# Patient Record
Sex: MALE | Race: WHITE | NOT HISPANIC OR LATINO | Employment: OTHER | URBAN - METROPOLITAN AREA
[De-identification: names, ages, dates, MRNs, and addresses within clinical notes are randomized per-mention and may not be internally consistent; named-entity substitution may affect disease eponyms.]

---

## 2017-04-11 ENCOUNTER — RECORDS - HEALTHEAST (OUTPATIENT)
Dept: LAB | Facility: CLINIC | Age: 67
End: 2017-04-11

## 2017-04-11 LAB
CHOLEST SERPL-MCNC: 121 MG/DL
FASTING STATUS PATIENT QL REPORTED: YES
HDLC SERPL-MCNC: 46 MG/DL
LDLC SERPL CALC-MCNC: 58 MG/DL
PSA SERPL-MCNC: 3.3 NG/ML (ref 0–4.5)
TRIGL SERPL-MCNC: 84 MG/DL

## 2017-09-26 ENCOUNTER — AMBULATORY - HEALTHEAST (OUTPATIENT)
Dept: CARDIOLOGY | Facility: CLINIC | Age: 67
End: 2017-09-26

## 2017-09-26 ENCOUNTER — RECORDS - HEALTHEAST (OUTPATIENT)
Dept: ADMINISTRATIVE | Facility: OTHER | Age: 67
End: 2017-09-26

## 2017-09-27 ENCOUNTER — OFFICE VISIT - HEALTHEAST (OUTPATIENT)
Dept: CARDIOLOGY | Facility: CLINIC | Age: 67
End: 2017-09-27

## 2017-09-27 DIAGNOSIS — I35.9 AORTIC VALVE DISORDER: ICD-10-CM

## 2017-09-27 DIAGNOSIS — I25.10 ATHEROSCLEROSIS OF NATIVE CORONARY ARTERY OF NATIVE HEART WITHOUT ANGINA PECTORIS: ICD-10-CM

## 2017-09-27 DIAGNOSIS — R06.09 DYSPNEA ON EXERTION: ICD-10-CM

## 2017-09-27 ASSESSMENT — MIFFLIN-ST. JEOR: SCORE: 1697.93

## 2017-10-03 ENCOUNTER — HOSPITAL ENCOUNTER (OUTPATIENT)
Dept: CARDIOLOGY | Facility: CLINIC | Age: 67
Discharge: HOME | End: 2017-10-03
Attending: INTERNAL MEDICINE

## 2017-10-03 DIAGNOSIS — I25.10 ATHEROSCLEROSIS OF NATIVE CORONARY ARTERY OF NATIVE HEART WITHOUT ANGINA PECTORIS: ICD-10-CM

## 2017-10-03 DIAGNOSIS — R06.09 OTHER FORMS OF DYSPNEA: ICD-10-CM

## 2017-10-03 DIAGNOSIS — R06.09 DYSPNEA ON EXERTION: ICD-10-CM

## 2017-10-03 DIAGNOSIS — I35.9 AORTIC VALVE DISORDER: ICD-10-CM

## 2017-10-03 LAB
CV STRESS CURRENT BP HE: NORMAL
CV STRESS CURRENT HR HE: 100
CV STRESS CURRENT HR HE: 101
CV STRESS CURRENT HR HE: 106
CV STRESS CURRENT HR HE: 108
CV STRESS CURRENT HR HE: 108
CV STRESS CURRENT HR HE: 114
CV STRESS CURRENT HR HE: 115
CV STRESS CURRENT HR HE: 117
CV STRESS CURRENT HR HE: 122
CV STRESS CURRENT HR HE: 124
CV STRESS CURRENT HR HE: 129
CV STRESS CURRENT HR HE: 132
CV STRESS CURRENT HR HE: 132
CV STRESS CURRENT HR HE: 135
CV STRESS CURRENT HR HE: 136
CV STRESS CURRENT HR HE: 136
CV STRESS CURRENT HR HE: 68
CV STRESS CURRENT HR HE: 68
CV STRESS CURRENT HR HE: 75
CV STRESS CURRENT HR HE: 84
CV STRESS CURRENT HR HE: 85
CV STRESS CURRENT HR HE: 88
CV STRESS CURRENT HR HE: 88
CV STRESS CURRENT HR HE: 90
CV STRESS CURRENT HR HE: 91
CV STRESS CURRENT HR HE: 92
CV STRESS CURRENT HR HE: 95
CV STRESS CURRENT HR HE: 98
CV STRESS CURRENT HR HE: 99
CV STRESS DEVIATION TIME HE: NORMAL
CV STRESS ECHO PERCENT HR HE: NORMAL
CV STRESS EXERCISE STAGE HE: NORMAL
CV STRESS FINAL RESTING BP HE: NORMAL
CV STRESS FINAL RESTING HR HE: 84
CV STRESS MAX HR HE: 138
CV STRESS MAX TREADMILL GRADE HE: 16
CV STRESS MAX TREADMILL SPEED HE: 4.2
CV STRESS PEAK DIA BP HE: NORMAL
CV STRESS PEAK SYS BP HE: NORMAL
CV STRESS PHASE HE: NORMAL
CV STRESS PROTOCOL HE: NORMAL
CV STRESS RESTING PT POSITION HE: NORMAL
CV STRESS RESTING PT POSITION HE: NORMAL
CV STRESS ST DEVIATION AMOUNT HE: NORMAL
CV STRESS ST DEVIATION ELEVATION HE: NORMAL
CV STRESS ST EVELATION AMOUNT HE: NORMAL
CV STRESS TEST TYPE HE: NORMAL
CV STRESS TOTAL STAGE TIME MIN 1 HE: NORMAL
STRESS ECHO BASELINE BP: NORMAL
STRESS ECHO BASELINE HR: 68
STRESS ECHO CALCULATED PERCENT HR: 90 %
STRESS ECHO LAST STRESS BP: NORMAL
STRESS ECHO LAST STRESS HR: 136
STRESS ECHO POST ESTIMATED WORKLOAD: 12.1
STRESS ECHO POST EXERCISE DUR MIN: 10
STRESS ECHO POST EXERCISE DUR SEC: 30
STRESS ECHO TARGET HR: 130

## 2018-06-26 ENCOUNTER — RECORDS - HEALTHEAST (OUTPATIENT)
Dept: LAB | Facility: CLINIC | Age: 68
End: 2018-06-26

## 2018-06-26 LAB
ALBUMIN SERPL-MCNC: 4 G/DL (ref 3.5–5)
ALP SERPL-CCNC: 99 U/L (ref 45–120)
ALT SERPL W P-5'-P-CCNC: 28 U/L (ref 0–45)
ANION GAP SERPL CALCULATED.3IONS-SCNC: 10 MMOL/L (ref 5–18)
AST SERPL W P-5'-P-CCNC: 25 U/L (ref 0–40)
BILIRUB SERPL-MCNC: 2.3 MG/DL (ref 0–1)
BUN SERPL-MCNC: 19 MG/DL (ref 8–22)
CALCIUM SERPL-MCNC: 9.6 MG/DL (ref 8.5–10.5)
CHLORIDE BLD-SCNC: 106 MMOL/L (ref 98–107)
CHOLEST SERPL-MCNC: 126 MG/DL
CO2 SERPL-SCNC: 23 MMOL/L (ref 22–31)
CREAT SERPL-MCNC: 1.11 MG/DL (ref 0.7–1.3)
FASTING STATUS PATIENT QL REPORTED: YES
GFR SERPL CREATININE-BSD FRML MDRD: >60 ML/MIN/1.73M2
GLUCOSE BLD-MCNC: 103 MG/DL (ref 70–125)
HDLC SERPL-MCNC: 45 MG/DL
LDLC SERPL CALC-MCNC: 66 MG/DL
POTASSIUM BLD-SCNC: 4.6 MMOL/L (ref 3.5–5)
PROT SERPL-MCNC: 6.8 G/DL (ref 6–8)
PSA SERPL-MCNC: 1.3 NG/ML (ref 0–4.5)
SODIUM SERPL-SCNC: 139 MMOL/L (ref 136–145)
TRIGL SERPL-MCNC: 77 MG/DL

## 2018-10-01 ENCOUNTER — RECORDS - HEALTHEAST (OUTPATIENT)
Dept: LAB | Facility: CLINIC | Age: 68
End: 2018-10-01

## 2018-10-01 LAB
ANION GAP SERPL CALCULATED.3IONS-SCNC: 8 MMOL/L (ref 5–18)
BUN SERPL-MCNC: 14 MG/DL (ref 8–22)
CALCIUM SERPL-MCNC: 10.4 MG/DL (ref 8.5–10.5)
CHLORIDE BLD-SCNC: 104 MMOL/L (ref 98–107)
CO2 SERPL-SCNC: 29 MMOL/L (ref 22–31)
CREAT SERPL-MCNC: 1 MG/DL (ref 0.7–1.3)
GFR SERPL CREATININE-BSD FRML MDRD: >60 ML/MIN/1.73M2
GLUCOSE BLD-MCNC: 110 MG/DL (ref 70–125)
POTASSIUM BLD-SCNC: 4.3 MMOL/L (ref 3.5–5)
SODIUM SERPL-SCNC: 141 MMOL/L (ref 136–145)

## 2018-10-02 RX ORDER — ATORVASTATIN CALCIUM 80 MG/1
80 TABLET, FILM COATED ORAL DAILY
COMMUNITY

## 2018-10-03 ENCOUNTER — ANESTHESIA EVENT (OUTPATIENT)
Dept: SURGERY | Facility: CLINIC | Age: 68
End: 2018-10-03
Payer: COMMERCIAL

## 2018-10-05 ENCOUNTER — HOSPITAL ENCOUNTER (OUTPATIENT)
Facility: CLINIC | Age: 68
Discharge: HOME | End: 2018-10-05
Attending: ORTHOPAEDIC SURGERY | Admitting: ORTHOPAEDIC SURGERY
Payer: COMMERCIAL

## 2018-10-05 ENCOUNTER — ANESTHESIA (OUTPATIENT)
Dept: SURGERY | Facility: CLINIC | Age: 68
End: 2018-10-05
Payer: COMMERCIAL

## 2018-10-05 VITALS
HEIGHT: 68 IN | BODY MASS INDEX: 32.58 KG/M2 | WEIGHT: 215 LBS | HEART RATE: 64 BPM | DIASTOLIC BLOOD PRESSURE: 73 MMHG | RESPIRATION RATE: 16 BRPM | TEMPERATURE: 98.1 F | OXYGEN SATURATION: 93 % | SYSTOLIC BLOOD PRESSURE: 151 MMHG

## 2018-10-05 DIAGNOSIS — G56.22 CUBITAL TUNNEL SYNDROME ON LEFT: Primary | ICD-10-CM

## 2018-10-05 PROCEDURE — 25000125 ZZHC RX 250: Performed by: NURSE ANESTHETIST, CERTIFIED REGISTERED

## 2018-10-05 PROCEDURE — 25000125 ZZHC RX 250: Performed by: ORTHOPAEDIC SURGERY

## 2018-10-05 PROCEDURE — 71000027 ZZH RECOVERY PHASE 2 EACH 15 MINS: Performed by: ORTHOPAEDIC SURGERY

## 2018-10-05 PROCEDURE — 25000128 H RX IP 250 OP 636: Performed by: NURSE ANESTHETIST, CERTIFIED REGISTERED

## 2018-10-05 PROCEDURE — 25000132 ZZH RX MED GY IP 250 OP 250 PS 637: Performed by: ORTHOPAEDIC SURGERY

## 2018-10-05 PROCEDURE — 40000305 ZZH STATISTIC PRE PROC ASSESS I: Performed by: ORTHOPAEDIC SURGERY

## 2018-10-05 PROCEDURE — 71000014 ZZH RECOVERY PHASE 1 LEVEL 2 FIRST HR: Performed by: ORTHOPAEDIC SURGERY

## 2018-10-05 PROCEDURE — 25000128 H RX IP 250 OP 636: Performed by: PHYSICIAN ASSISTANT

## 2018-10-05 PROCEDURE — 37000008 ZZH ANESTHESIA TECHNICAL FEE, 1ST 30 MIN: Performed by: ORTHOPAEDIC SURGERY

## 2018-10-05 PROCEDURE — 36000056 ZZH SURGERY LEVEL 3 1ST 30 MIN: Performed by: ORTHOPAEDIC SURGERY

## 2018-10-05 PROCEDURE — 37000009 ZZH ANESTHESIA TECHNICAL FEE, EACH ADDTL 15 MIN: Performed by: ORTHOPAEDIC SURGERY

## 2018-10-05 PROCEDURE — 27210794 ZZH OR GENERAL SUPPLY STERILE: Performed by: ORTHOPAEDIC SURGERY

## 2018-10-05 PROCEDURE — 88304 TISSUE EXAM BY PATHOLOGIST: CPT | Mod: 26 | Performed by: ORTHOPAEDIC SURGERY

## 2018-10-05 PROCEDURE — 36000058 ZZH SURGERY LEVEL 3 EA 15 ADDTL MIN: Performed by: ORTHOPAEDIC SURGERY

## 2018-10-05 PROCEDURE — 88304 TISSUE EXAM BY PATHOLOGIST: CPT | Performed by: ORTHOPAEDIC SURGERY

## 2018-10-05 PROCEDURE — 25000132 ZZH RX MED GY IP 250 OP 250 PS 637: Performed by: NURSE ANESTHETIST, CERTIFIED REGISTERED

## 2018-10-05 PROCEDURE — 25000564 ZZH DESFLURANE, EA 15 MIN: Performed by: ORTHOPAEDIC SURGERY

## 2018-10-05 PROCEDURE — 25000132 ZZH RX MED GY IP 250 OP 250 PS 637: Performed by: PHYSICIAN ASSISTANT

## 2018-10-05 RX ORDER — CEFAZOLIN SODIUM 1 G/50ML
1 INJECTION, SOLUTION INTRAVENOUS SEE ADMIN INSTRUCTIONS
Status: DISCONTINUED | OUTPATIENT
Start: 2018-10-05 | End: 2018-10-05 | Stop reason: HOSPADM

## 2018-10-05 RX ORDER — ACETAMINOPHEN 325 MG/1
975 TABLET ORAL ONCE
Status: DISCONTINUED | OUTPATIENT
Start: 2018-10-05 | End: 2018-10-05

## 2018-10-05 RX ORDER — OXYCODONE HYDROCHLORIDE 5 MG/1
5 TABLET ORAL
Status: DISCONTINUED | OUTPATIENT
Start: 2018-10-05 | End: 2018-10-05 | Stop reason: HOSPADM

## 2018-10-05 RX ORDER — ONDANSETRON 4 MG/1
4 TABLET, ORALLY DISINTEGRATING ORAL EVERY 30 MIN PRN
Status: DISCONTINUED | OUTPATIENT
Start: 2018-10-05 | End: 2018-10-05

## 2018-10-05 RX ORDER — DEXAMETHASONE SODIUM PHOSPHATE 4 MG/ML
INJECTION, SOLUTION INTRA-ARTICULAR; INTRALESIONAL; INTRAMUSCULAR; INTRAVENOUS; SOFT TISSUE PRN
Status: DISCONTINUED | OUTPATIENT
Start: 2018-10-05 | End: 2018-10-05

## 2018-10-05 RX ORDER — NALOXONE HYDROCHLORIDE 0.4 MG/ML
.1-.4 INJECTION, SOLUTION INTRAMUSCULAR; INTRAVENOUS; SUBCUTANEOUS
Status: DISCONTINUED | OUTPATIENT
Start: 2018-10-05 | End: 2018-10-05 | Stop reason: HOSPADM

## 2018-10-05 RX ORDER — BUPIVACAINE HYDROCHLORIDE AND EPINEPHRINE 5; 5 MG/ML; UG/ML
INJECTION, SOLUTION PERINEURAL PRN
Status: DISCONTINUED | OUTPATIENT
Start: 2018-10-05 | End: 2018-10-05 | Stop reason: HOSPADM

## 2018-10-05 RX ORDER — HYDROCODONE BITARTRATE AND ACETAMINOPHEN 5; 325 MG/1; MG/1
1 TABLET ORAL EVERY 6 HOURS PRN
Status: DISCONTINUED | OUTPATIENT
Start: 2018-10-05 | End: 2018-10-05 | Stop reason: HOSPADM

## 2018-10-05 RX ORDER — FENTANYL CITRATE 50 UG/ML
25-50 INJECTION, SOLUTION INTRAMUSCULAR; INTRAVENOUS
Status: DISCONTINUED | OUTPATIENT
Start: 2018-10-05 | End: 2018-10-05 | Stop reason: HOSPADM

## 2018-10-05 RX ORDER — LIDOCAINE 40 MG/G
CREAM TOPICAL
Status: DISCONTINUED | OUTPATIENT
Start: 2018-10-05 | End: 2018-10-05 | Stop reason: HOSPADM

## 2018-10-05 RX ORDER — HYDROCODONE BITARTRATE AND ACETAMINOPHEN 5; 325 MG/1; MG/1
1-2 TABLET ORAL EVERY 4 HOURS PRN
Qty: 10 TABLET | Refills: 0
Start: 2018-10-05 | End: 2023-04-07

## 2018-10-05 RX ORDER — LIDOCAINE HYDROCHLORIDE 10 MG/ML
INJECTION, SOLUTION EPIDURAL; INFILTRATION; INTRACAUDAL; PERINEURAL PRN
Status: DISCONTINUED | OUTPATIENT
Start: 2018-10-05 | End: 2018-10-05

## 2018-10-05 RX ORDER — MEPERIDINE HYDROCHLORIDE 25 MG/ML
12.5 INJECTION INTRAMUSCULAR; INTRAVENOUS; SUBCUTANEOUS
Status: DISCONTINUED | OUTPATIENT
Start: 2018-10-05 | End: 2018-10-05 | Stop reason: HOSPADM

## 2018-10-05 RX ORDER — SCOLOPAMINE TRANSDERMAL SYSTEM 1 MG/1
1 PATCH, EXTENDED RELEASE TRANSDERMAL
Status: DISCONTINUED | OUTPATIENT
Start: 2018-10-05 | End: 2018-10-05 | Stop reason: HOSPADM

## 2018-10-05 RX ORDER — CELECOXIB 200 MG/1
200 CAPSULE ORAL
Status: DISCONTINUED | OUTPATIENT
Start: 2018-10-05 | End: 2018-10-05 | Stop reason: HOSPADM

## 2018-10-05 RX ORDER — SODIUM CHLORIDE, SODIUM LACTATE, POTASSIUM CHLORIDE, CALCIUM CHLORIDE 600; 310; 30; 20 MG/100ML; MG/100ML; MG/100ML; MG/100ML
INJECTION, SOLUTION INTRAVENOUS CONTINUOUS
Status: DISCONTINUED | OUTPATIENT
Start: 2018-10-05 | End: 2018-10-05 | Stop reason: HOSPADM

## 2018-10-05 RX ORDER — MEPERIDINE HYDROCHLORIDE 25 MG/ML
12.5 INJECTION INTRAMUSCULAR; INTRAVENOUS; SUBCUTANEOUS
Status: DISCONTINUED | OUTPATIENT
Start: 2018-10-05 | End: 2018-10-05

## 2018-10-05 RX ORDER — CELECOXIB 200 MG/1
200 CAPSULE ORAL ONCE
Status: COMPLETED | OUTPATIENT
Start: 2018-10-05 | End: 2018-10-05

## 2018-10-05 RX ORDER — ONDANSETRON 2 MG/ML
INJECTION INTRAMUSCULAR; INTRAVENOUS PRN
Status: DISCONTINUED | OUTPATIENT
Start: 2018-10-05 | End: 2018-10-05

## 2018-10-05 RX ORDER — ALBUTEROL SULFATE 0.83 MG/ML
2.5 SOLUTION RESPIRATORY (INHALATION) EVERY 4 HOURS PRN
Status: DISCONTINUED | OUTPATIENT
Start: 2018-10-05 | End: 2018-10-05 | Stop reason: HOSPADM

## 2018-10-05 RX ORDER — HYDROMORPHONE HYDROCHLORIDE 1 MG/ML
.3-.5 INJECTION, SOLUTION INTRAMUSCULAR; INTRAVENOUS; SUBCUTANEOUS EVERY 10 MIN PRN
Status: DISCONTINUED | OUTPATIENT
Start: 2018-10-05 | End: 2018-10-05 | Stop reason: HOSPADM

## 2018-10-05 RX ORDER — CEFAZOLIN SODIUM 2 G/100ML
2 INJECTION, SOLUTION INTRAVENOUS
Status: COMPLETED | OUTPATIENT
Start: 2018-10-05 | End: 2018-10-05

## 2018-10-05 RX ORDER — HYDROMORPHONE HYDROCHLORIDE 1 MG/ML
.3-.5 INJECTION, SOLUTION INTRAMUSCULAR; INTRAVENOUS; SUBCUTANEOUS EVERY 10 MIN PRN
Status: DISCONTINUED | OUTPATIENT
Start: 2018-10-05 | End: 2018-10-05

## 2018-10-05 RX ORDER — ALBUTEROL SULFATE 0.83 MG/ML
2.5 SOLUTION RESPIRATORY (INHALATION) EVERY 4 HOURS PRN
Status: DISCONTINUED | OUTPATIENT
Start: 2018-10-05 | End: 2018-10-05

## 2018-10-05 RX ORDER — ACETAMINOPHEN 325 MG/1
975 TABLET ORAL ONCE
Status: COMPLETED | OUTPATIENT
Start: 2018-10-05 | End: 2018-10-05

## 2018-10-05 RX ORDER — GABAPENTIN 300 MG/1
300 CAPSULE ORAL
Status: COMPLETED | OUTPATIENT
Start: 2018-10-05 | End: 2018-10-05

## 2018-10-05 RX ORDER — NALOXONE HYDROCHLORIDE 0.4 MG/ML
.1-.4 INJECTION, SOLUTION INTRAMUSCULAR; INTRAVENOUS; SUBCUTANEOUS
Status: DISCONTINUED | OUTPATIENT
Start: 2018-10-05 | End: 2018-10-05

## 2018-10-05 RX ORDER — GLYCOPYRROLATE 0.2 MG/ML
INJECTION, SOLUTION INTRAMUSCULAR; INTRAVENOUS PRN
Status: DISCONTINUED | OUTPATIENT
Start: 2018-10-05 | End: 2018-10-05

## 2018-10-05 RX ORDER — ONDANSETRON 4 MG/1
4 TABLET, ORALLY DISINTEGRATING ORAL EVERY 30 MIN PRN
Status: DISCONTINUED | OUTPATIENT
Start: 2018-10-05 | End: 2018-10-05 | Stop reason: HOSPADM

## 2018-10-05 RX ORDER — FENTANYL CITRATE 50 UG/ML
INJECTION, SOLUTION INTRAMUSCULAR; INTRAVENOUS PRN
Status: DISCONTINUED | OUTPATIENT
Start: 2018-10-05 | End: 2018-10-05

## 2018-10-05 RX ORDER — ONDANSETRON 2 MG/ML
4 INJECTION INTRAMUSCULAR; INTRAVENOUS EVERY 30 MIN PRN
Status: DISCONTINUED | OUTPATIENT
Start: 2018-10-05 | End: 2018-10-05 | Stop reason: HOSPADM

## 2018-10-05 RX ORDER — FENTANYL CITRATE 50 UG/ML
25-50 INJECTION, SOLUTION INTRAMUSCULAR; INTRAVENOUS
Status: DISCONTINUED | OUTPATIENT
Start: 2018-10-05 | End: 2018-10-05

## 2018-10-05 RX ORDER — ONDANSETRON 2 MG/ML
4 INJECTION INTRAMUSCULAR; INTRAVENOUS EVERY 30 MIN PRN
Status: DISCONTINUED | OUTPATIENT
Start: 2018-10-05 | End: 2018-10-05

## 2018-10-05 RX ORDER — GABAPENTIN 300 MG/1
300 CAPSULE ORAL ONCE
Status: DISCONTINUED | OUTPATIENT
Start: 2018-10-05 | End: 2018-10-05

## 2018-10-05 RX ORDER — EPHEDRINE SULFATE 50 MG/ML
INJECTION, SOLUTION INTRAMUSCULAR; INTRAVENOUS; SUBCUTANEOUS PRN
Status: DISCONTINUED | OUTPATIENT
Start: 2018-10-05 | End: 2018-10-05

## 2018-10-05 RX ORDER — PROPOFOL 10 MG/ML
INJECTION, EMULSION INTRAVENOUS PRN
Status: DISCONTINUED | OUTPATIENT
Start: 2018-10-05 | End: 2018-10-05

## 2018-10-05 RX ADMIN — CEFAZOLIN SODIUM 2 G: 2 INJECTION, SOLUTION INTRAVENOUS at 13:41

## 2018-10-05 RX ADMIN — GABAPENTIN 300 MG: 300 CAPSULE ORAL at 11:42

## 2018-10-05 RX ADMIN — PHENYLEPHRINE HYDROCHLORIDE 200 MCG: 10 INJECTION, SOLUTION INTRAMUSCULAR; INTRAVENOUS; SUBCUTANEOUS at 13:58

## 2018-10-05 RX ADMIN — SCOPALAMINE 1 PATCH: 1 PATCH, EXTENDED RELEASE TRANSDERMAL at 13:33

## 2018-10-05 RX ADMIN — FENTANYL CITRATE 50 MCG: 50 INJECTION, SOLUTION INTRAMUSCULAR; INTRAVENOUS at 13:46

## 2018-10-05 RX ADMIN — LIDOCAINE HYDROCHLORIDE 1 ML: 10 INJECTION, SOLUTION EPIDURAL; INFILTRATION; INTRACAUDAL; PERINEURAL at 11:44

## 2018-10-05 RX ADMIN — GLYCOPYRROLATE 0.2 MG: 0.2 INJECTION, SOLUTION INTRAMUSCULAR; INTRAVENOUS at 13:49

## 2018-10-05 RX ADMIN — SODIUM CHLORIDE, POTASSIUM CHLORIDE, SODIUM LACTATE AND CALCIUM CHLORIDE: 600; 310; 30; 20 INJECTION, SOLUTION INTRAVENOUS at 11:44

## 2018-10-05 RX ADMIN — HYDROCODONE BITARTRATE AND ACETAMINOPHEN 1 TABLET: 5; 325 TABLET ORAL at 15:13

## 2018-10-05 RX ADMIN — DEXAMETHASONE SODIUM PHOSPHATE 8 MG: 4 INJECTION, SOLUTION INTRA-ARTICULAR; INTRALESIONAL; INTRAMUSCULAR; INTRAVENOUS; SOFT TISSUE at 13:49

## 2018-10-05 RX ADMIN — PROPOFOL 200 MG: 10 INJECTION, EMULSION INTRAVENOUS at 13:49

## 2018-10-05 RX ADMIN — CELECOXIB 200 MG: 200 CAPSULE ORAL at 11:42

## 2018-10-05 RX ADMIN — LIDOCAINE HYDROCHLORIDE 50 MG: 10 INJECTION, SOLUTION EPIDURAL; INFILTRATION; INTRACAUDAL; PERINEURAL at 13:49

## 2018-10-05 RX ADMIN — FENTANYL CITRATE 100 MCG: 50 INJECTION, SOLUTION INTRAMUSCULAR; INTRAVENOUS at 13:49

## 2018-10-05 RX ADMIN — ACETAMINOPHEN 975 MG: 325 TABLET, FILM COATED ORAL at 11:42

## 2018-10-05 RX ADMIN — ONDANSETRON 4 MG: 2 INJECTION INTRAMUSCULAR; INTRAVENOUS at 13:49

## 2018-10-05 RX ADMIN — MIDAZOLAM 2 MG: 1 INJECTION INTRAMUSCULAR; INTRAVENOUS at 13:41

## 2018-10-05 RX ADMIN — Medication 10 MG: at 13:53

## 2018-10-05 RX ADMIN — Medication 10 MG: at 13:56

## 2018-10-05 NOTE — ANESTHESIA PREPROCEDURE EVALUATION
Anesthesia Evaluation     . Pt has had prior anesthetic. Type: General and MAC    History of anesthetic complications   - PONV        ROS/MED HX    ENT/Pulmonary:  - neg pulmonary ROS     Neurologic:  - neg neurologic ROS     Cardiovascular:     (+) Dyslipidemia, hypertension--CAD (Mild), --. : . . . :. valvular problems/murmurs (Mild/mod unchanged) type: AS .       METS/Exercise Tolerance:  >4 METS   Hematologic:  - neg hematologic  ROS       Musculoskeletal:   (+) , , other musculoskeletal- Spinal stenosis      GI/Hepatic:  - neg GI/hepatic ROS       Renal/Genitourinary:  - ROS Renal section negative       Endo:  - neg endo ROS       Psychiatric:  - neg psychiatric ROS       Infectious Disease:  - neg infectious disease ROS       Malignancy:      - no malignancy   Other:    - neg other ROS                 Physical Exam  Normal systems: cardiovascular and pulmonary    Airway   Mallampati: III  TM distance: >3 FB  Neck ROM: full    Dental   (+) upper dentures and partials    Cardiovascular       Pulmonary                     Anesthesia Plan      History & Physical Review      ASA Status:  2 .    NPO Status:  > 8 hours    Plan for General and LMA with Intravenous and Propofol induction. Maintenance will be Balanced.    PONV prophylaxis:  Ondansetron (or other 5HT-3), Dexamethasone or Solumedrol and Scopolamine patch  The History and Physical has been reviewed, the patient has been examined and no changes have occurred in the patient's condition since the H & P was completed.         Postoperative Care  Postoperative pain management:  IV analgesics and Oral pain medications.      Consents  Anesthetic plan, risks, benefits and alternatives discussed with:  Patient..                          .

## 2018-10-05 NOTE — IP AVS SNAPSHOT
MRN:[id number]                      After Visit Summary   10/5/2018    Chandler Eldridge    MRN: [id number]           Thank you!     Thank you for choosing [facility] for your care. Our goal is always to provide you with excellent care. Hearing back from our patients is one way we can continue to improve our services. Please take a few minutes to complete the written survey that you may receive in the mail after you visit with us. Thank you!        Patient Information     Date Of Birth          1950        About your hospital stay     You were admitted on:  October 5, 2018 You last received care in the:  [facility] PreOP/Phase II    You were discharged on:  October 5, 2018       Who to Call     For medical emergencies, please call 911.  For non-urgent questions about your medical care, please call your primary care provider or clinic, [phone]  For questions related to your surgery, please call your surgery clinic        Attending Provider     Provider Specialty    Gene Garcia MD Hand Surgery       Primary Care Provider Office Phone # Fax #    Naman Padron [phone]      After Care Instructions      Diet as Tolerated       Return to diet before surgery, unless instructed otherwise.            Discharge Instructions       Review outpatient procedure discharge instructions with patient as directed by Provider            Ice to affected area       Ice pack to surgical site every 15 minutes per hour for 24 hours            No Dressing Change       No dressing change until follow up appointment.            Return to clinic       Return to clinic in 2 weeks            Shower        Cover dressing if dressing is not going to be changed today                  Further instructions from your care team                           Same Day Surgery Discharge Instructions  Special Precautions After Surgery - Adult    1. It is not unusual to feel lightheaded or faint, up to 24 hours  after surgery or while taking pain medication.  If you have these symptoms; sit for a few minutes before standing and have someone assist you when getting up.  2. You should rest and relax for the next 24 hours and must have someone stay with you for at least 24 hours after your discharge.  3. DO NOT DRIVE any vehicle or operate mechanical equipment for 24 hours following the end of your surgery.  DO NOT DRIVE while taking narcotic pain medications that have been prescribed by your physician.  If you had a limb operated on, you must be able to use it fully to drive.  4. DO NOT drink alcoholic beverages for 24 hours following surgery or while taking prescription pain medication.  5. Drink clear liquids (apple juice, ginger ale, broth, 7-Up, etc.).  Progress to your regular diet as you feel able.  6. Any questions call your physician and do not make important decisions for 24 hours.    ACTIVITY  ? Rest today.  No activity or diet restrictions.  ? Resume activity as tolerated.  ? Restrictions: per MD directions  ? See printed discharge sheet.     INCISIONAL CARE  ? Do not remove dressing until seen by physician.  ? Keep extremity elevated above the level of the heart if possible.  .  ? Apply ice 1/2 hour on and 1/2 hour off while awake.  ? Be alert for signs of infection:  redness, swelling, heat, drainage of pus, and/or elevated temperature.  Contact your doctor if these occur.        Call for an appointment to return to the clinic in 10-14 days.    Medications:  ? Hydrocodone (Norco, Vicodin):  Last dose: 3 pm.  ? Stool softeners to prevent constipation   ? Follow the instructions on the bottle.     Additional discharge instructions: sling per MD orders  __________________________________________________________________________________________________________________________________  IMPORTANT NUMBERS:    [facility] [place]  [phone], [phone]  Pharmacy:  [phone]  Same Day Surgery:  [phone], Monday  "- Friday until 8:30 p.m.  Urgent Care:  [phone]  Emergency Room:  [phone]      [facility]:  [phone]           Pending Results     Date and Time Order Name Status Description    10/5/2018 1409 Surgical pathology exam In process             Admission Information     Date & Time Provider Department Dept. Phone    10/5/2018 Gene Garcia MD [facility] PreOP/Phase [phone]      Your Vitals Were     Blood Pressure Pulse Temperature Respirations Height Weight    154/82 64 98.1  F (36.7  C) (Oral) 15 1.727 m (5' 8\") 97.5 kg (215 lb)    Pulse Oximetry BMI (Body Mass Index)                93% 32.69 kg/m2          MyChart Information     [facility] lets you send messages to your doctor, view your test results, renew your prescriptions, schedule appointments and more. To sign up, go to [email or web address] . Click on \"Log in\" on the left side of the screen, which will take you to the Welcome page. Then click on \"Sign up Now\" on the right side of the page.     You will be asked to enter the access code listed below, as well as some personal information. Please follow the directions to create your username and password.     Your access code is: [email or web address]  Expires: 1/3/2019  3:57 PM     Your access code will  in 90 days. If you need help or a new code, please call your [facility] clinic or [phone].        Care EveryWhere ID     This is your Care EveryWhere ID. This could be used by other organizations to access your [facility] medical records  [id number]        Equal Access to Services     [facility] : Hadii daphney Abernathy, melvina jalloh, qamax macias . So [facility] [phone].    ATENCIÓN: Si habla español, tiene a corrales disposición servicios gratuitos de asistencia lingüística. Llame al [phone].    We comply with applicable federal civil rights laws and [place] laws. We do not discriminate on the basis of " race, color, national origin, age, disability, sex, sexual orientation, or gender identity.               Review of your medicines      START taking        Dose / Directions    HYDROcodone-acetaminophen 5-325 MG per tablet   Commonly known as:  NORCO   Used for:  Cubital tunnel syndrome on left        Dose:  1-2 tablet   Take 1-2 tablets by mouth every 4 hours as needed (Moderate to Severe Pain)   Quantity:  10 tablet   Refills:  0         CONTINUE these medicines which have NOT CHANGED        Dose / Directions    atorvastatin 80 MG tablet   Commonly known as:  LIPITOR        Dose:  80 mg   Take 80 mg by mouth daily   Refills:  0       LOSARTAN POTASSIUM PO        Dose:  80 mg   Take 80 mg by mouth   Refills:  0            Where to get your medicines      Some of these will need a paper prescription and others can be bought over the counter. Ask your nurse if you have questions.     You don't need a prescription for these medications     HYDROcodone-acetaminophen 5-325 MG per tablet                Protect others around you: Learn how to safely use, store and throw away your medicines at www.disposemymeds.org.        Information about OPIOIDS     PRESCRIPTION OPIOIDS: WHAT YOU NEED TO KNOW   We gave you an opioid (narcotic) pain medicine. It is important to manage your pain, but opioids are not always the best choice. You should first try all the other options your care team gave you. Take this medicine for as short a time (and as few doses) as possible.    Some activities can increase your pain, such as bandage changes or therapy sessions. It may help to take your pain medicine 30 to 60 minutes before these activities. Reduce your stress by getting enough sleep, working on hobbies you enjoy and practicing relaxation or meditation. Talk to your care team about ways to manage your pain beyond prescription opioids.    These medicines have risks:    DO NOT drive when on new or higher doses of pain medicine. These  medicines can affect your alertness and reaction times, and you could be arrested for driving under the influence (DUI). If you need to use opioids long-term, talk to your care team about driving.    DO NOT operate heavy machinery    DO NOT do any other dangerous activities while taking these medicines.    DO NOT drink any alcohol while taking these medicines.     If the opioid prescribed includes acetaminophen, DO NOT take with any other medicines that contain acetaminophen. Read all labels carefully. Look for the word  acetaminophen  or  Tylenol.  Ask your pharmacist if you have questions or are unsure.    You can get addicted to pain medicines, especially if you have a history of addiction (chemical, alcohol or substance dependence). Talk to your care team about ways to reduce this risk.    All opioids tend to cause constipation. Drink plenty of water and eat foods that have a lot of fiber, such as fruits, vegetables, prune juice, apple juice and high-fiber cereal. Take a laxative (Miralax, milk of magnesia, Colace, Senna) if you don t move your bowels at least every other day. Other side effects include upset stomach, sleepiness, dizziness, throwing up, tolerance (needing more of the medicine to have the same effect), physical dependence and slowed breathing.    Store your pills in a secure place, locked if possible. We will not replace any lost or stolen medicine. If you don t finish your medicine, please throw away (dispose) as directed by your pharmacist. The [place] Pollution Control Agency has more information about safe disposal: https://www.pca.[facility].[place].us/living-green/managing-unwanted-medications             Medication List: This is a list of all your medications and when to take them. Check marks below indicate your daily home schedule. Keep this list as a reference.      Medications           Morning Afternoon Evening Bedtime As Needed    atorvastatin 80 MG tablet   Commonly known as:  LIPITOR    Take 80 mg by mouth daily                                HYDROcodone-acetaminophen 5-325 MG per tablet   Commonly known as:  NORCO   Take 1-2 tablets by mouth every 4 hours as needed (Moderate to Severe Pain)   Last time this was given:  1 tablet on 10/5/2018  3:13 PM                                LOSARTAN POTASSIUM PO   Take 80 mg by mouth

## 2018-10-05 NOTE — OP NOTE
[facility]   Operative Note    SURGEON:  Gene Garcia M.D.    ASSISTANT:  Alma Gandara PA-C  A first assistant/PA was necessary in this case to provide careful retraction, protect the neurovascular structures, and to assure safe and smooth progression throughout the case. The PA was present throughout the case including position and through application of the dressing.    PREOPERATIVE DIAGNOSIS  Left cubital tunnel syndrome    POSTOPERATIVE DIAGNOSIS  Left cubital tunnel syndrome  Left elbow subcutaneous lipoma    OPERATION  Left endoscopic cubital tunnel release  Left elbow lipoma excision    ANESTHESIA  General    ESTIMATED BLOOD LOSS  * No values recorded between 10/5/2018  2:06 PM and 10/5/2018  2:39 PM *    PROCEDURE:  The patient was taken to the main operating suite.  Once there, the patient was transferred over to the operating table.  Anesthesia was induced.  The left upper extremity was prepped and draped in the usual sterile fashion.  The arm was exsanguinated with an Esmarch bandage and the tourniquet was inflated to 250 mmHg.     Attention was then turned to the elbow.  A 3-cm incision was made just posterior to the medial epicondyle.  The medial antebrachial cutaneous nerve was carefully protected throughout the course of the case. In the subcutaneous tissues a nodular fatty mass was clearly evident just below the skin. This appeared to have the consistency of a lipoma and it was right in the surgical field. It was excised and sent to pathology. It measured approximately 2 cm in diameter. The cubital tunnel was entered.  The fascia was markedly thickened and scarred and several layers of fascia initially needed to be released to expose the nerve. The nerve was noted to have a fairly thickened and grossly scarred appearance.  Agosto s ligament was somewhat thickened.  Using the A.M. surgical endoscopic system, the elevator was utilized.  I first elevated the soft  tissues off the ulnar nerve proximally.  The 2.7-mm clear cannula was then put in place along with a 2.7-mm camera.  The ulnar nerve was identified and the fascia overlying the ulnar nerve was appreciated.  The slot in the cannula was placed opposite the nerve.  The blade was then attached to the scope and the fascia was completely incised to a level of 10-cm proximal to the incision.  Attention was then turned distally.  The elevator was utilized to elevate the fascia off the ulnar nerve distally.  The 2.7-mm clear cannula was then put in place distally along the course of the nerve.  The nerve was identified and protected deep to the cannula and the fascia was identified superficial to the slot on the cannula.  The two heads of the flexor carpi ulnaris were very clearly converged.  The blade was then attached and the fascia was released.  The ulnar nerve was once again confirmed in a deep position and protected throughout the course of the case.  In this fashion, the fascia was released to a level about 10 cm distal to the incision.  The elbow was taken through a full range of motion and no subluxation of the nerve was appreciated.  The nerve was appreciated to have Thickened and scarred.  The incisions were then infiltrated with 0.5% Marcaine with epinephrine.  The skin was closed with 3-0 Stratafix in a running subcuticular fashion for the elbow incision, 4-0 nylon in horizontal mattress fashion for the wrist incision.  A dressing consisting of Adaptic gauze, 4 x 4 fluffs, sterile Webril, and an Ace bandage was then applied.  Tourniquet was let down for a total of tourniquet time of 27 minutes.  The patient s fingers pinked up briskly.  He was awakened, transferred to the transport cart, and taken to the recovery room in stable condition breathing spontaneously.

## 2018-10-05 NOTE — DISCHARGE INSTRUCTIONS
Same Day Surgery Discharge Instructions  Special Precautions After Surgery - Adult    1. It is not unusual to feel lightheaded or faint, up to 24 hours after surgery or while taking pain medication.  If you have these symptoms; sit for a few minutes before standing and have someone assist you when getting up.  2. You should rest and relax for the next 24 hours and must have someone stay with you for at least 24 hours after your discharge.  3. DO NOT DRIVE any vehicle or operate mechanical equipment for 24 hours following the end of your surgery.  DO NOT DRIVE while taking narcotic pain medications that have been prescribed by your physician.  If you had a limb operated on, you must be able to use it fully to drive.  4. DO NOT drink alcoholic beverages for 24 hours following surgery or while taking prescription pain medication.  5. Drink clear liquids (apple juice, ginger ale, broth, 7-Up, etc.).  Progress to your regular diet as you feel able.  6. Any questions call your physician and do not make important decisions for 24 hours.    ACTIVITY  ? Rest today.  No activity or diet restrictions.  ? Resume activity as tolerated.  ? Restrictions: per MD directions  ? See printed discharge sheet.     INCISIONAL CARE  ? Do not remove dressing until seen by physician.  ? Keep extremity elevated above the level of the heart if possible.  .  ? Apply ice 1/2 hour on and 1/2 hour off while awake.  ? Be alert for signs of infection:  redness, swelling, heat, drainage of pus, and/or elevated temperature.  Contact your doctor if these occur.        Call for an appointment to return to the clinic in 10-14 days.    Medications:  ? Hydrocodone (Norco, Vicodin):  Last dose: 3 pm.  ? Stool softeners to prevent constipation   ? Follow the instructions on the bottle.     Additional discharge instructions: royal per MD  orders  __________________________________________________________________________________________________________________________________  IMPORTANT NUMBERS:    [facility] [place] Main Number:  [phone], [phone]  Pharmacy:  [phone]  Same Day Surgery:  [phone], Monday - Friday until 8:30 p.m.  Urgent Care:  [phone]  Emergency Room:  [phone]      [facility] Orth[facility]ics:  [phone]

## 2018-10-05 NOTE — ANESTHESIA CARE TRANSFER NOTE
Patient: Chandler Eldridge    Procedure(s):  Left Arm Endoscopic Cubital Tunnel Release - Wound Class: I-Clean    Diagnosis: left cubital tunnel syndrome  Diagnosis Additional Information: No value filed.    Anesthesia Type:   General, LMA     Note:  Airway :Nasal Cannula  Patient transferred to:PACU  Handoff Report: Identifed the Patient, Identified the Reponsible Provider, Reviewed the pertinent medical history, Discussed the surgical course, Reviewed Intra-OP anesthesia mangement and issues during anesthesia, Set expectations for post-procedure period and Allowed opportunity for questions and acknowledgement of understanding      Vitals: (Last set prior to Anesthesia Care Transfer)    CRNA VITALS  10/5/2018 1413 - 10/5/2018 1452      10/5/2018             Pulse: 101    SpO2: 96 %    Resp Rate (observed): (!)  31                Electronically Signed By: BERTRAM Iyer CRNA  October 5, 2018  2:52 PM

## 2018-10-05 NOTE — IP AVS SNAPSHOT
[facility] PreOP/Phase II    [address] [address]    Phone:  [phone]    Fax:  [phone]                                       After Visit Summary   10/5/2018    Chandler Eldridge    MRN: [id number]           After Visit Summary Signature Page     I have received my discharge instructions, and my questions have been answered. I have discussed any challenges I see with this plan with the nurse or doctor.    ..........................................................................................................................................  Patient/Patient Representative Signature      ..........................................................................................................................................  Patient Representative Print Name and Relationship to Patient    ..................................................               ................................................  Date                                   Time    ..........................................................................................................................................  Reviewed by Signature/Title    ...................................................              ..............................................  Date                                               Time          22EPIC Rev 08/18

## 2018-10-05 NOTE — ANESTHESIA POSTPROCEDURE EVALUATION
Patient: Chandler Eldridge    Procedure(s):  Left Arm Endoscopic Cubital Tunnel Release - Wound Class: I-Clean    Diagnosis:left cubital tunnel syndrome  Diagnosis Additional Information: No value filed.    Anesthesia Type:  General, LMA    Note:  Anesthesia Post Evaluation    Patient location during evaluation: Bedside  Patient participation: Able to fully participate in evaluation  Level of consciousness: awake and alert  Pain management: adequate  Airway patency: patent  Cardiovascular status: stable  Respiratory status: room air  Hydration status: stable  PONV: none     Anesthetic complications: None          Last vitals:  Vitals:    10/05/18 1527 10/05/18 1543 10/05/18 1615   BP: 148/82 154/82 151/73   Pulse:      Resp: 11 15 16   Temp:      SpO2: 95% 93%          Electronically Signed By: BERTRAM Pino CRNA  October 5, 2018  4:58 PM

## 2018-10-12 LAB — COPATH REPORT: NORMAL

## 2018-10-16 ENCOUNTER — RECORDS - HEALTHEAST (OUTPATIENT)
Dept: LAB | Facility: CLINIC | Age: 68
End: 2018-10-16

## 2018-10-16 LAB
ANION GAP SERPL CALCULATED.3IONS-SCNC: 9 MMOL/L (ref 5–18)
BUN SERPL-MCNC: 19 MG/DL (ref 8–22)
CALCIUM SERPL-MCNC: 9.9 MG/DL (ref 8.5–10.5)
CHLORIDE BLD-SCNC: 104 MMOL/L (ref 98–107)
CO2 SERPL-SCNC: 25 MMOL/L (ref 22–31)
CREAT SERPL-MCNC: 1.06 MG/DL (ref 0.7–1.3)
GFR SERPL CREATININE-BSD FRML MDRD: >60 ML/MIN/1.73M2
GLUCOSE BLD-MCNC: 117 MG/DL (ref 70–125)
POTASSIUM BLD-SCNC: 4.5 MMOL/L (ref 3.5–5)
SODIUM SERPL-SCNC: 138 MMOL/L (ref 136–145)

## 2019-11-20 ENCOUNTER — RECORDS - HEALTHEAST (OUTPATIENT)
Dept: LAB | Facility: CLINIC | Age: 69
End: 2019-11-20

## 2019-11-20 LAB
ALBUMIN SERPL-MCNC: 4.2 G/DL (ref 3.5–5)
ALP SERPL-CCNC: 99 U/L (ref 45–120)
ALT SERPL W P-5'-P-CCNC: 29 U/L (ref 0–45)
ANION GAP SERPL CALCULATED.3IONS-SCNC: 8 MMOL/L (ref 5–18)
AST SERPL W P-5'-P-CCNC: 25 U/L (ref 0–40)
BILIRUB SERPL-MCNC: 2.3 MG/DL (ref 0–1)
BUN SERPL-MCNC: 16 MG/DL (ref 8–22)
CALCIUM SERPL-MCNC: 9.5 MG/DL (ref 8.5–10.5)
CHLORIDE BLD-SCNC: 104 MMOL/L (ref 98–107)
CHOLEST SERPL-MCNC: 159 MG/DL
CO2 SERPL-SCNC: 25 MMOL/L (ref 22–31)
CREAT SERPL-MCNC: 1.17 MG/DL (ref 0.7–1.3)
FASTING STATUS PATIENT QL REPORTED: YES
GFR SERPL CREATININE-BSD FRML MDRD: >60 ML/MIN/1.73M2
GLUCOSE BLD-MCNC: 108 MG/DL (ref 70–125)
HDLC SERPL-MCNC: 43 MG/DL
LDLC SERPL CALC-MCNC: 88 MG/DL
POTASSIUM BLD-SCNC: 4.5 MMOL/L (ref 3.5–5)
PROT SERPL-MCNC: 6.9 G/DL (ref 6–8)
PSA SERPL-MCNC: 1.4 NG/ML (ref 0–4.5)
SODIUM SERPL-SCNC: 137 MMOL/L (ref 136–145)
TRIGL SERPL-MCNC: 140 MG/DL

## 2019-11-22 ENCOUNTER — AMBULATORY - HEALTHEAST (OUTPATIENT)
Dept: CARDIOLOGY | Facility: CLINIC | Age: 69
End: 2019-11-22

## 2019-11-22 DIAGNOSIS — I35.9 AORTIC VALVE DISORDER: ICD-10-CM

## 2019-12-20 ENCOUNTER — HOSPITAL ENCOUNTER (OUTPATIENT)
Dept: CARDIOLOGY | Facility: CLINIC | Age: 69
Discharge: HOME | End: 2019-12-20
Attending: INTERNAL MEDICINE

## 2019-12-20 DIAGNOSIS — I35.9 AORTIC VALVE DISORDER: ICD-10-CM

## 2019-12-20 LAB
AORTIC ROOT: 4.1 CM
AORTIC VALVE MEAN VELOCITY: 278 CM/S
AV DIMENSIONLESS INDEX VTI: 0.3
AV MEAN GRADIENT: 36 MMHG
AV PEAK GRADIENT: 66.6 MMHG
AV VALVE AREA: 1.1 CM2
AV VELOCITY RATIO: 0.3
BSA FOR ECHO PROCEDURE: 2.15 M2
CV BLOOD PRESSURE: ABNORMAL MMHG
CV ECHO HEIGHT: 69 IN
CV ECHO WEIGHT: 210 LBS
DOP CALC AO PEAK VEL: 408 CM/S
DOP CALC AO VTI: 101 CM
DOP CALC LVOT AREA: 3.8 CM2
DOP CALC LVOT DIAMETER: 2.2 CM
DOP CALC LVOT PEAK VEL: 109 CM/S
DOP CALC LVOT STROKE VOLUME: 112.1 CM3
DOP CALCLVOT PEAK VEL VTI: 29.5 CM
EJECTION FRACTION: 68 % (ref 55–75)
FRACTIONAL SHORTENING: 41.9 % (ref 28–44)
INTERVENTRICULAR SEPTUM IN END DIASTOLE: 1.36 CM (ref 0.6–1)
IVS/PW RATIO: 1
LA AREA 1: 20.4 CM2
LA AREA 2: 26.9 CM2
LEFT ATRIUM LENGTH: 5.45 CM
LEFT ATRIUM SIZE: 4.1 CM
LEFT ATRIUM TO AORTIC ROOT RATIO: 1 NO UNITS
LEFT ATRIUM VOLUME INDEX: 39.8 ML/M2
LEFT ATRIUM VOLUME: 85.6 ML
LEFT VENTRICLE CARDIAC INDEX: 3.6 L/MIN/M2
LEFT VENTRICLE CARDIAC OUTPUT: 7.8 L/MIN
LEFT VENTRICLE DIASTOLIC VOLUME INDEX: 67.4 CM3/M2 (ref 34–74)
LEFT VENTRICLE DIASTOLIC VOLUME: 145 CM3 (ref 62–150)
LEFT VENTRICLE HEART RATE: 70 BPM
LEFT VENTRICLE MASS INDEX: 124.7 G/M2
LEFT VENTRICLE SYSTOLIC VOLUME INDEX: 21.9 CM3/M2 (ref 11–31)
LEFT VENTRICLE SYSTOLIC VOLUME: 47 CM3 (ref 21–61)
LEFT VENTRICULAR INTERNAL DIMENSION IN DIASTOLE: 4.75 CM (ref 4.2–5.8)
LEFT VENTRICULAR INTERNAL DIMENSION IN SYSTOLE: 2.76 CM (ref 2.5–4)
LEFT VENTRICULAR MASS: 268.1 G
LEFT VENTRICULAR OUTFLOW TRACT MEAN GRADIENT: 3 MMHG
LEFT VENTRICULAR OUTFLOW TRACT MEAN VELOCITY: 76.2 CM/S
LEFT VENTRICULAR OUTFLOW TRACT PEAK GRADIENT: 5 MMHG
LEFT VENTRICULAR POSTERIOR WALL IN END DIASTOLE: 1.43 CM (ref 0.6–1)
LV STROKE VOLUME INDEX: 52.1 ML/M2
MITRAL VALVE E/A RATIO: 0.8
MV AVERAGE E/E' RATIO: 13.1 CM/S
MV DECELERATION TIME: 264 MS
MV E'TISSUE VEL-LAT: 6.34 CM/S
MV E'TISSUE VEL-MED: 4.68 CM/S
MV LATERAL E/E' RATIO: 11.4
MV MEDIAL E/E' RATIO: 15.4
MV PEAK A VELOCITY: 89.3 CM/S
MV PEAK E VELOCITY: 72.1 CM/S
NUC REST DIASTOLIC VOLUME INDEX: 3360 LBS
NUC REST SYSTOLIC VOLUME INDEX: 69 IN
PR MAX PG: 3 MMHG
PR PEAK VELOCITY: 89.6 CM/S
TRICUSPID VALVE ANULAR PLANE SYSTOLIC EXCURSION: 2.8 CM

## 2019-12-20 ASSESSMENT — MIFFLIN-ST. JEOR: SCORE: 1697.93

## 2019-12-30 ENCOUNTER — COMMUNICATION - HEALTHEAST (OUTPATIENT)
Dept: CARDIOLOGY | Facility: CLINIC | Age: 69
End: 2019-12-30

## 2020-01-15 ENCOUNTER — RECORDS - HEALTHEAST (OUTPATIENT)
Dept: ADMINISTRATIVE | Facility: OTHER | Age: 70
End: 2020-01-15

## 2020-01-15 ENCOUNTER — AMBULATORY - HEALTHEAST (OUTPATIENT)
Dept: CARDIOLOGY | Facility: CLINIC | Age: 70
End: 2020-01-15

## 2020-01-16 ENCOUNTER — OFFICE VISIT - HEALTHEAST (OUTPATIENT)
Dept: CARDIOLOGY | Facility: CLINIC | Age: 70
End: 2020-01-16

## 2020-01-16 ENCOUNTER — AMBULATORY - HEALTHEAST (OUTPATIENT)
Dept: CARDIOLOGY | Facility: CLINIC | Age: 70
End: 2020-01-16

## 2020-01-16 DIAGNOSIS — I25.10 ATHEROSCLEROSIS OF NATIVE CORONARY ARTERY OF NATIVE HEART WITHOUT ANGINA PECTORIS: ICD-10-CM

## 2020-01-16 DIAGNOSIS — R06.09 DYSPNEA ON EXERTION: ICD-10-CM

## 2020-01-16 DIAGNOSIS — I35.9 AORTIC VALVE DISORDER: ICD-10-CM

## 2020-01-16 ASSESSMENT — MIFFLIN-ST. JEOR: SCORE: 1811.33

## 2020-01-21 ENCOUNTER — SURGERY - HEALTHEAST (OUTPATIENT)
Dept: CARDIOLOGY | Facility: CLINIC | Age: 70
End: 2020-01-21

## 2020-01-21 ENCOUNTER — COMMUNICATION - HEALTHEAST (OUTPATIENT)
Dept: CARDIOLOGY | Facility: CLINIC | Age: 70
End: 2020-01-21

## 2020-01-22 ENCOUNTER — AMBULATORY - HEALTHEAST (OUTPATIENT)
Dept: CARDIOLOGY | Facility: CLINIC | Age: 70
End: 2020-01-22

## 2020-01-22 ENCOUNTER — SURGERY - HEALTHEAST (OUTPATIENT)
Dept: CARDIOLOGY | Facility: CLINIC | Age: 70
End: 2020-01-22

## 2020-01-22 DIAGNOSIS — I35.0 AORTIC STENOSIS: ICD-10-CM

## 2020-01-22 ASSESSMENT — MIFFLIN-ST. JEOR: SCORE: 1727.41

## 2020-01-27 ENCOUNTER — HOSPITAL ENCOUNTER (OUTPATIENT)
Dept: ULTRASOUND IMAGING | Facility: CLINIC | Age: 70
Discharge: HOME | End: 2020-01-27
Attending: INTERNAL MEDICINE

## 2020-01-27 DIAGNOSIS — I35.9 AORTIC VALVE DISORDER: ICD-10-CM

## 2020-01-27 DIAGNOSIS — R06.09 DYSPNEA ON EXERTION: ICD-10-CM

## 2020-01-27 DIAGNOSIS — R06.09 OTHER FORMS OF DYSPNEA: ICD-10-CM

## 2020-01-27 DIAGNOSIS — I25.10 ATHEROSCLEROSIS OF NATIVE CORONARY ARTERY OF NATIVE HEART WITHOUT ANGINA PECTORIS: ICD-10-CM

## 2020-02-13 ENCOUNTER — HOSPITAL ENCOUNTER (OUTPATIENT)
Dept: CT IMAGING | Facility: CLINIC | Age: 70
Discharge: HOME | End: 2020-02-13
Attending: INTERNAL MEDICINE

## 2020-02-13 DIAGNOSIS — I35.0 AORTIC STENOSIS: ICD-10-CM

## 2020-02-26 ENCOUNTER — AMBULATORY - HEALTHEAST (OUTPATIENT)
Dept: CARDIOLOGY | Facility: CLINIC | Age: 70
End: 2020-02-26

## 2020-02-26 DIAGNOSIS — I35.9 AORTIC VALVE DISORDER: ICD-10-CM

## 2020-02-27 ENCOUNTER — AMBULATORY - HEALTHEAST (OUTPATIENT)
Dept: CARDIOLOGY | Facility: CLINIC | Age: 70
End: 2020-02-27

## 2020-02-27 ENCOUNTER — OFFICE VISIT - HEALTHEAST (OUTPATIENT)
Dept: CARDIOLOGY | Facility: CLINIC | Age: 70
End: 2020-02-27

## 2020-02-27 DIAGNOSIS — R06.09 DOE (DYSPNEA ON EXERTION): ICD-10-CM

## 2020-02-27 DIAGNOSIS — I35.9 AORTIC VALVE DISORDER: ICD-10-CM

## 2020-02-27 DIAGNOSIS — I25.10 CORONARY ARTERY DISEASE INVOLVING NATIVE HEART, ANGINA PRESENCE UNSPECIFIED, UNSPECIFIED VESSEL OR LESION TYPE: ICD-10-CM

## 2020-02-27 DIAGNOSIS — I35.0 NONRHEUMATIC AORTIC VALVE STENOSIS: ICD-10-CM

## 2020-02-27 LAB
ALBUMIN SERPL-MCNC: 3.9 G/DL (ref 3.5–5)
ALP SERPL-CCNC: 108 U/L (ref 45–120)
ALT SERPL W P-5'-P-CCNC: 26 U/L (ref 0–45)
ANION GAP SERPL CALCULATED.3IONS-SCNC: 7 MMOL/L (ref 5–18)
AST SERPL W P-5'-P-CCNC: 24 U/L (ref 0–40)
BILIRUB SERPL-MCNC: 2.1 MG/DL (ref 0–1)
BUN SERPL-MCNC: 16 MG/DL (ref 8–28)
CALCIUM SERPL-MCNC: 10 MG/DL (ref 8.5–10.5)
CHLORIDE BLD-SCNC: 105 MMOL/L (ref 98–107)
CO2 SERPL-SCNC: 28 MMOL/L (ref 22–31)
CREAT SERPL-MCNC: 1.18 MG/DL (ref 0.7–1.3)
ERYTHROCYTE [DISTWIDTH] IN BLOOD BY AUTOMATED COUNT: 12 % (ref 11–14.5)
GFR SERPL CREATININE-BSD FRML MDRD: >60 ML/MIN/1.73M2
GLUCOSE BLD-MCNC: 160 MG/DL (ref 70–125)
HCT VFR BLD AUTO: 43.3 % (ref 40–54)
HGB BLD-MCNC: 14.8 G/DL (ref 14–18)
MCH RBC QN AUTO: 30.5 PG (ref 27–34)
MCHC RBC AUTO-ENTMCNC: 34.2 G/DL (ref 32–36)
MCV RBC AUTO: 89 FL (ref 80–100)
PLATELET # BLD AUTO: 203 THOU/UL (ref 140–440)
PMV BLD AUTO: 9.5 FL (ref 8.5–12.5)
POTASSIUM BLD-SCNC: 4.3 MMOL/L (ref 3.5–5)
PROT SERPL-MCNC: 7.3 G/DL (ref 6–8)
RBC # BLD AUTO: 4.85 MILL/UL (ref 4.4–6.2)
SODIUM SERPL-SCNC: 140 MMOL/L (ref 136–145)
WBC: 7.5 THOU/UL (ref 4–11)

## 2020-02-27 ASSESSMENT — MIFFLIN-ST. JEOR: SCORE: 1809.06

## 2020-02-28 LAB
ATRIAL RATE - MUSE: 63 BPM
DIASTOLIC BLOOD PRESSURE - MUSE: NORMAL
INTERPRETATION ECG - MUSE: NORMAL
P AXIS - MUSE: 48 DEGREES
PR INTERVAL - MUSE: 226 MS
QRS DURATION - MUSE: 78 MS
QT - MUSE: 414 MS
QTC - MUSE: 423 MS
R AXIS - MUSE: 8 DEGREES
SYSTOLIC BLOOD PRESSURE - MUSE: NORMAL
T AXIS - MUSE: 184 DEGREES
VENTRICULAR RATE- MUSE: 63 BPM

## 2020-03-09 ENCOUNTER — OFFICE VISIT - HEALTHEAST (OUTPATIENT)
Dept: CARDIOLOGY | Facility: CLINIC | Age: 70
End: 2020-03-09

## 2020-03-09 DIAGNOSIS — I35.0 NONRHEUMATIC AORTIC VALVE STENOSIS: ICD-10-CM

## 2020-03-09 ASSESSMENT — MIFFLIN-ST. JEOR: SCORE: 1804.52

## 2020-03-11 ENCOUNTER — AMBULATORY - HEALTHEAST (OUTPATIENT)
Dept: CARDIOLOGY | Facility: CLINIC | Age: 70
End: 2020-03-11

## 2020-03-11 DIAGNOSIS — I35.0 SEVERE AORTIC STENOSIS: ICD-10-CM

## 2020-03-11 DIAGNOSIS — I50.9 CHF (CONGESTIVE HEART FAILURE) (H): ICD-10-CM

## 2020-03-12 ENCOUNTER — COMMUNICATION - HEALTHEAST (OUTPATIENT)
Dept: CARDIOLOGY | Facility: CLINIC | Age: 70
End: 2020-03-12

## 2020-03-13 ENCOUNTER — COMMUNICATION - HEALTHEAST (OUTPATIENT)
Dept: CARDIOLOGY | Facility: CLINIC | Age: 70
End: 2020-03-13

## 2020-03-13 ENCOUNTER — SURGERY - HEALTHEAST (OUTPATIENT)
Dept: CARDIOLOGY | Facility: CLINIC | Age: 70
End: 2020-03-13

## 2020-03-13 DIAGNOSIS — I35.0 SEVERE AORTIC STENOSIS: ICD-10-CM

## 2020-03-16 ENCOUNTER — COMMUNICATION - HEALTHEAST (OUTPATIENT)
Dept: CARDIOLOGY | Facility: CLINIC | Age: 70
End: 2020-03-16

## 2020-03-16 ENCOUNTER — ANESTHESIA - HEALTHEAST (OUTPATIENT)
Dept: CARDIOLOGY | Facility: CLINIC | Age: 70
End: 2020-03-16

## 2020-03-17 ENCOUNTER — AMBULATORY - HEALTHEAST (OUTPATIENT)
Dept: CARDIOLOGY | Facility: CLINIC | Age: 70
End: 2020-03-17

## 2020-03-17 ENCOUNTER — SURGERY - HEALTHEAST (OUTPATIENT)
Dept: CARDIOLOGY | Facility: CLINIC | Age: 70
End: 2020-03-17

## 2020-03-17 DIAGNOSIS — Z95.2 S/P TAVR (TRANSCATHETER AORTIC VALVE REPLACEMENT): ICD-10-CM

## 2020-03-17 ASSESSMENT — MIFFLIN-ST. JEOR: SCORE: 1796.02

## 2020-03-18 ASSESSMENT — MIFFLIN-ST. JEOR: SCORE: 1790.92

## 2020-03-19 ASSESSMENT — MIFFLIN-ST. JEOR: SCORE: 1785.02

## 2020-03-25 ENCOUNTER — OFFICE VISIT - HEALTHEAST (OUTPATIENT)
Dept: CARDIOLOGY | Facility: CLINIC | Age: 70
End: 2020-03-25

## 2020-03-25 DIAGNOSIS — I10 ESSENTIAL HYPERTENSION: ICD-10-CM

## 2020-03-25 DIAGNOSIS — Z95.2 S/P TAVR (TRANSCATHETER AORTIC VALVE REPLACEMENT): ICD-10-CM

## 2020-03-25 DIAGNOSIS — I35.0 SEVERE AORTIC STENOSIS: ICD-10-CM

## 2020-03-25 DIAGNOSIS — I25.10 CORONARY ARTERY DISEASE INVOLVING NATIVE CORONARY ARTERY OF NATIVE HEART WITHOUT ANGINA PECTORIS: ICD-10-CM

## 2020-04-01 ENCOUNTER — AMBULATORY - HEALTHEAST (OUTPATIENT)
Dept: CARDIAC REHAB | Facility: CLINIC | Age: 70
End: 2020-04-01

## 2020-04-01 ENCOUNTER — AMBULATORY - HEALTHEAST (OUTPATIENT)
Dept: CARDIOLOGY | Facility: CLINIC | Age: 70
End: 2020-04-01

## 2020-04-01 ENCOUNTER — COMMUNICATION - HEALTHEAST (OUTPATIENT)
Dept: CARDIOLOGY | Facility: CLINIC | Age: 70
End: 2020-04-01

## 2020-04-01 DIAGNOSIS — Z95.2 S/P TAVR (TRANSCATHETER AORTIC VALVE REPLACEMENT): ICD-10-CM

## 2020-04-01 DIAGNOSIS — I10 ESSENTIAL HYPERTENSION: ICD-10-CM

## 2020-04-03 ENCOUNTER — AMBULATORY - HEALTHEAST (OUTPATIENT)
Dept: CARDIAC REHAB | Facility: CLINIC | Age: 70
End: 2020-04-03

## 2020-04-03 DIAGNOSIS — Z95.2 S/P TAVR (TRANSCATHETER AORTIC VALVE REPLACEMENT): ICD-10-CM

## 2020-04-06 ENCOUNTER — AMBULATORY - HEALTHEAST (OUTPATIENT)
Dept: CARDIAC REHAB | Facility: CLINIC | Age: 70
End: 2020-04-06

## 2020-04-06 DIAGNOSIS — Z95.2 S/P TAVR (TRANSCATHETER AORTIC VALVE REPLACEMENT): ICD-10-CM

## 2020-04-10 ENCOUNTER — AMBULATORY - HEALTHEAST (OUTPATIENT)
Dept: CARDIAC REHAB | Facility: CLINIC | Age: 70
End: 2020-04-10

## 2020-04-10 DIAGNOSIS — Z95.2 S/P TAVR (TRANSCATHETER AORTIC VALVE REPLACEMENT): ICD-10-CM

## 2020-04-15 ENCOUNTER — AMBULATORY - HEALTHEAST (OUTPATIENT)
Dept: CARDIAC REHAB | Facility: CLINIC | Age: 70
End: 2020-04-15

## 2020-04-15 DIAGNOSIS — Z95.2 S/P TAVR (TRANSCATHETER AORTIC VALVE REPLACEMENT): ICD-10-CM

## 2020-04-17 ENCOUNTER — COMMUNICATION - HEALTHEAST (OUTPATIENT)
Dept: CARDIOLOGY | Facility: CLINIC | Age: 70
End: 2020-04-17

## 2020-04-22 ENCOUNTER — AMBULATORY - HEALTHEAST (OUTPATIENT)
Dept: CARDIAC REHAB | Facility: CLINIC | Age: 70
End: 2020-04-22

## 2020-04-22 DIAGNOSIS — Z95.2 S/P TAVR (TRANSCATHETER AORTIC VALVE REPLACEMENT): ICD-10-CM

## 2020-04-29 ENCOUNTER — AMBULATORY - HEALTHEAST (OUTPATIENT)
Dept: CARDIAC REHAB | Facility: CLINIC | Age: 70
End: 2020-04-29

## 2020-04-29 DIAGNOSIS — Z95.2 S/P TAVR (TRANSCATHETER AORTIC VALVE REPLACEMENT): ICD-10-CM

## 2020-05-04 ENCOUNTER — HOSPITAL ENCOUNTER (OUTPATIENT)
Dept: CARDIOLOGY | Facility: CLINIC | Age: 70
Discharge: HOME | End: 2020-05-04
Attending: INTERNAL MEDICINE

## 2020-05-04 DIAGNOSIS — I35.0 SEVERE AORTIC STENOSIS: ICD-10-CM

## 2020-05-04 LAB
AORTIC ROOT: 3.6 CM
AORTIC VALVE MEAN VELOCITY: 159 CM/S
AR DECEL SLOPE: 2110 MM/S2
AR PEAK VELOCITY: 442 CM/S
AV DIMENSIONLESS INDEX VTI: 0.6
AV MEAN GRADIENT: 11 MMHG
AV PEAK GRADIENT: 20.4 MMHG
AV REGURGITANT PEAK GRADIENT: 78.1 MMHG
AV REGURGITATION PRESSURE HALF TIME: 615 MS
AV VALVE AREA: 2.6 CM2
AV VELOCITY RATIO: 0.7
BSA FOR ECHO PROCEDURE: 2.23 M2
CV BLOOD PRESSURE: ABNORMAL MMHG
CV ECHO HEIGHT: 68 IN
CV ECHO WEIGHT: 229 LBS
DOP CALC AO PEAK VEL: 226 CM/S
DOP CALC AO VTI: 50.1 CM
DOP CALC LVOT AREA: 4.15 CM2
DOP CALC LVOT DIAMETER: 2.3 CM
DOP CALC LVOT PEAK VEL: 150 CM/S
DOP CALC LVOT STROKE VOLUME: 129.1 CM3
DOP CALCLVOT PEAK VEL VTI: 31.1 CM
EJECTION FRACTION: 65 % (ref 55–75)
FRACTIONAL SHORTENING: 44.7 % (ref 28–44)
INTERVENTRICULAR SEPTUM IN END DIASTOLE: 1.3 CM (ref 0.6–1)
IVS/PW RATIO: 1
LA AREA 1: 19.5 CM2
LA AREA 2: 19.6 CM2
LEFT ATRIUM LENGTH: 5.4 CM
LEFT ATRIUM SIZE: 4.2 CM
LEFT ATRIUM TO AORTIC ROOT RATIO: 1.17 NO UNITS
LEFT ATRIUM VOLUME INDEX: 27 ML/M2
LEFT ATRIUM VOLUME: 60.2 ML
LEFT VENTRICLE CARDIAC INDEX: 3.9 L/MIN/M2
LEFT VENTRICLE CARDIAC OUTPUT: 8.7 L/MIN
LEFT VENTRICLE DIASTOLIC VOLUME INDEX: 47.1 CM3/M2 (ref 34–74)
LEFT VENTRICLE DIASTOLIC VOLUME: 105 CM3 (ref 62–150)
LEFT VENTRICLE HEART RATE: 67 BPM
LEFT VENTRICLE MASS INDEX: 140.7 G/M2
LEFT VENTRICLE SYSTOLIC VOLUME INDEX: 16.4 CM3/M2 (ref 11–31)
LEFT VENTRICLE SYSTOLIC VOLUME: 36.5 CM3 (ref 21–61)
LEFT VENTRICULAR INTERNAL DIMENSION IN DIASTOLE: 5.7 CM (ref 4.2–5.8)
LEFT VENTRICULAR INTERNAL DIMENSION IN SYSTOLE: 3.15 CM (ref 2.5–4)
LEFT VENTRICULAR MASS: 313.7 G
LEFT VENTRICULAR OUTFLOW TRACT MEAN GRADIENT: 5 MMHG
LEFT VENTRICULAR OUTFLOW TRACT MEAN VELOCITY: 102 CM/S
LEFT VENTRICULAR OUTFLOW TRACT PEAK GRADIENT: 9 MMHG
LEFT VENTRICULAR POSTERIOR WALL IN END DIASTOLE: 1.25 CM (ref 0.6–1)
LV STROKE VOLUME INDEX: 57.9 ML/M2
MITRAL VALVE DECELERATION SLOPE: 2520 MM/S2
MITRAL VALVE E/A RATIO: 0.9
MITRAL VALVE PRESSURE HALF-TIME: 103 MS
MV AVERAGE E/E' RATIO: 12.1 CM/S
MV DECELERATION TIME: 352 MS
MV E'TISSUE VEL-LAT: 8.61 CM/S
MV E'TISSUE VEL-MED: 6 CM/S
MV LATERAL E/E' RATIO: 10.3
MV MEDIAL E/E' RATIO: 14.8
MV PEAK A VELOCITY: 97.4 CM/S
MV PEAK E VELOCITY: 88.6 CM/S
MV VALVE AREA PRESSURE 1/2 METHOD: 2.1 CM2
NUC REST DIASTOLIC VOLUME INDEX: 3664 LBS
NUC REST SYSTOLIC VOLUME INDEX: 68 IN
PR MAX PG: 5 MMHG
PR PEAK VELOCITY: 111 CM/S
TRICUSPID VALVE ANULAR PLANE SYSTOLIC EXCURSION: 2.8 CM

## 2020-05-04 ASSESSMENT — MIFFLIN-ST. JEOR: SCORE: 1768.24

## 2020-05-06 ENCOUNTER — OFFICE VISIT - HEALTHEAST (OUTPATIENT)
Dept: CARDIOLOGY | Facility: CLINIC | Age: 70
End: 2020-05-06

## 2020-05-06 DIAGNOSIS — Z95.2 S/P TAVR (TRANSCATHETER AORTIC VALVE REPLACEMENT): ICD-10-CM

## 2020-05-14 ENCOUNTER — COMMUNICATION - HEALTHEAST (OUTPATIENT)
Dept: CARDIOLOGY | Facility: CLINIC | Age: 70
End: 2020-05-14

## 2020-09-24 ENCOUNTER — OFFICE VISIT - HEALTHEAST (OUTPATIENT)
Dept: CARDIOLOGY | Facility: CLINIC | Age: 70
End: 2020-09-24

## 2020-09-24 DIAGNOSIS — I10 ESSENTIAL HYPERTENSION: ICD-10-CM

## 2020-09-24 DIAGNOSIS — I25.10 CORONARY ARTERY DISEASE INVOLVING NATIVE CORONARY ARTERY OF NATIVE HEART WITHOUT ANGINA PECTORIS: ICD-10-CM

## 2020-09-24 DIAGNOSIS — Z95.2 S/P TAVR (TRANSCATHETER AORTIC VALVE REPLACEMENT): ICD-10-CM

## 2020-09-24 ASSESSMENT — MIFFLIN-ST. JEOR: SCORE: 1790.92

## 2020-10-06 ENCOUNTER — COMMUNICATION - HEALTHEAST (OUTPATIENT)
Dept: CARDIOLOGY | Facility: CLINIC | Age: 70
End: 2020-10-06

## 2020-10-06 ENCOUNTER — AMBULATORY - HEALTHEAST (OUTPATIENT)
Dept: CARDIOLOGY | Facility: CLINIC | Age: 70
End: 2020-10-06

## 2020-10-06 DIAGNOSIS — I25.10 CORONARY ARTERY DISEASE INVOLVING NATIVE CORONARY ARTERY OF NATIVE HEART WITHOUT ANGINA PECTORIS: ICD-10-CM

## 2020-10-06 DIAGNOSIS — I10 ESSENTIAL HYPERTENSION: ICD-10-CM

## 2020-10-06 LAB
CHOLEST SERPL-MCNC: 134 MG/DL
FASTING STATUS PATIENT QL REPORTED: YES
HDLC SERPL-MCNC: 46 MG/DL
LDLC SERPL CALC-MCNC: 70 MG/DL
TRIGL SERPL-MCNC: 92 MG/DL

## 2020-10-26 ENCOUNTER — COMMUNICATION - HEALTHEAST (OUTPATIENT)
Dept: CARDIOLOGY | Facility: CLINIC | Age: 70
End: 2020-10-26

## 2020-11-06 ENCOUNTER — COMMUNICATION - HEALTHEAST (OUTPATIENT)
Dept: CARDIOLOGY | Facility: CLINIC | Age: 70
End: 2020-11-06

## 2021-01-05 ENCOUNTER — COMMUNICATION - HEALTHEAST (OUTPATIENT)
Dept: CARDIOLOGY | Facility: CLINIC | Age: 71
End: 2021-01-05

## 2021-01-05 DIAGNOSIS — I10 ESSENTIAL HYPERTENSION: ICD-10-CM

## 2021-03-02 ENCOUNTER — COMMUNICATION - HEALTHEAST (OUTPATIENT)
Dept: SCHEDULING | Facility: CLINIC | Age: 71
End: 2021-03-02

## 2021-03-02 ENCOUNTER — COMMUNICATION - HEALTHEAST (OUTPATIENT)
Dept: CARDIOLOGY | Facility: CLINIC | Age: 71
End: 2021-03-02

## 2021-03-02 ENCOUNTER — AMBULATORY - HEALTHEAST (OUTPATIENT)
Dept: CARDIOLOGY | Facility: CLINIC | Age: 71
End: 2021-03-02

## 2021-03-02 DIAGNOSIS — Z95.2 S/P TAVR (TRANSCATHETER AORTIC VALVE REPLACEMENT): ICD-10-CM

## 2021-03-25 ENCOUNTER — HOSPITAL ENCOUNTER (OUTPATIENT)
Dept: CARDIOLOGY | Facility: CLINIC | Age: 71
Discharge: HOME | End: 2021-03-25
Attending: INTERNAL MEDICINE

## 2021-03-25 DIAGNOSIS — Z95.2 S/P TAVR (TRANSCATHETER AORTIC VALVE REPLACEMENT): ICD-10-CM

## 2021-03-25 LAB
AORTIC ROOT: 2.5 CM
AORTIC VALVE MEAN VELOCITY: 164 CM/S
AR DECEL SLOPE: 3300 MM/S2
AR PEAK VELOCITY: 504 CM/S
ASCENDING AORTA: 3.8 CM
AV DIMENSIONLESS INDEX VTI: 0.5
AV MEAN GRADIENT: 12 MMHG
AV PEAK GRADIENT: 25.4 MMHG
AV REGURGITANT PEAK GRADIENT: 101.6 MMHG
AV REGURGITATION PRESSURE HALF TIME: 447 MS
AV VALVE AREA: 2.2 CM2
AV VELOCITY RATIO: 0.5
BSA FOR ECHO PROCEDURE: 2.25 M2
CV BLOOD PRESSURE: ABNORMAL MMHG
CV ECHO HEIGHT: 68 IN
CV ECHO WEIGHT: 234 LBS
DOP CALC AO PEAK VEL: 252 CM/S
DOP CALC AO VTI: 55.9 CM
DOP CALC LVOT AREA: 4.15 CM2
DOP CALC LVOT DIAMETER: 2.3 CM
DOP CALC LVOT PEAK VEL: 126 CM/S
DOP CALC LVOT STROKE VOLUME: 123.7 CM3
DOP CALCLVOT PEAK VEL VTI: 29.8 CM
EJECTION FRACTION: 65 % (ref 55–75)
FRACTIONAL SHORTENING: 46.2 % (ref 28–44)
INTERVENTRICULAR SEPTUM IN END DIASTOLE: 1.37 CM (ref 0.6–1)
IVS/PW RATIO: 1.3
LA AREA 1: 18 CM2
LA AREA 2: 22.9 CM2
LEFT ATRIUM AREA: 18 CM2
LEFT ATRIUM LENGTH: 5.1 CM
LEFT ATRIUM SIZE: 4.1 CM
LEFT ATRIUM VOLUME INDEX: 30.5 ML/M2
LEFT ATRIUM VOLUME: 68.7 ML
LEFT VENTRICLE CARDIAC INDEX: 3.3 L/MIN/M2
LEFT VENTRICLE CARDIAC OUTPUT: 7.4 L/MIN
LEFT VENTRICLE DIASTOLIC VOLUME INDEX: 59.6 CM3/M2 (ref 34–74)
LEFT VENTRICLE DIASTOLIC VOLUME: 134 CM3 (ref 62–150)
LEFT VENTRICLE HEART RATE: 60 BPM
LEFT VENTRICLE MASS INDEX: 100.3 G/M2
LEFT VENTRICLE SYSTOLIC VOLUME INDEX: 20.9 CM3/M2 (ref 11–31)
LEFT VENTRICLE SYSTOLIC VOLUME: 47 CM3 (ref 21–61)
LEFT VENTRICULAR INTERNAL DIMENSION IN DIASTOLE: 4.89 CM (ref 4.2–5.8)
LEFT VENTRICULAR INTERNAL DIMENSION IN SYSTOLE: 2.63 CM (ref 2.5–4)
LEFT VENTRICULAR MASS: 225.6 G
LEFT VENTRICULAR OUTFLOW TRACT MEAN GRADIENT: 4 MMHG
LEFT VENTRICULAR OUTFLOW TRACT MEAN VELOCITY: 88.3 CM/S
LEFT VENTRICULAR OUTFLOW TRACT PEAK GRADIENT: 6 MMHG
LEFT VENTRICULAR POSTERIOR WALL IN END DIASTOLE: 1.03 CM (ref 0.6–1)
LV STROKE VOLUME INDEX: 55 ML/M2
MITRAL VALVE E/A RATIO: 1
MV DECELERATION TIME: 268 MS
MV PEAK A VELOCITY: 92.3 CM/S
MV PEAK E VELOCITY: 91.3 CM/S
NUC REST DIASTOLIC VOLUME INDEX: 3744 LBS
NUC REST SYSTOLIC VOLUME INDEX: 68 IN
PR MAX PG: 5 MMHG
PR PEAK VELOCITY: 113 CM/S
TRICUSPID REGURGITATION PEAK PRESSURE GRADIENT: 29.2 MMHG
TRICUSPID VALVE ANULAR PLANE SYSTOLIC EXCURSION: 2.3 CM
TRICUSPID VALVE PEAK REGURGITANT VELOCITY: 270 CM/S

## 2021-03-25 ASSESSMENT — MIFFLIN-ST. JEOR: SCORE: 1790.92

## 2021-04-02 ENCOUNTER — COMMUNICATION - HEALTHEAST (OUTPATIENT)
Dept: CARDIOLOGY | Facility: CLINIC | Age: 71
End: 2021-04-02

## 2021-04-02 DIAGNOSIS — I10 ESSENTIAL HYPERTENSION: ICD-10-CM

## 2021-04-05 ENCOUNTER — OFFICE VISIT - HEALTHEAST (OUTPATIENT)
Dept: CARDIOLOGY | Facility: CLINIC | Age: 71
End: 2021-04-05

## 2021-04-05 DIAGNOSIS — E66.01 MORBID OBESITY (H): ICD-10-CM

## 2021-04-05 DIAGNOSIS — I10 ESSENTIAL HYPERTENSION: ICD-10-CM

## 2021-04-05 DIAGNOSIS — Z95.2 S/P TAVR (TRANSCATHETER AORTIC VALVE REPLACEMENT): ICD-10-CM

## 2021-04-05 LAB
ANION GAP SERPL CALCULATED.3IONS-SCNC: 10 MMOL/L (ref 5–18)
BUN SERPL-MCNC: 17 MG/DL (ref 8–28)
CALCIUM SERPL-MCNC: 9.1 MG/DL (ref 8.5–10.5)
CHLORIDE BLD-SCNC: 104 MMOL/L (ref 98–107)
CO2 SERPL-SCNC: 24 MMOL/L (ref 22–31)
CREAT SERPL-MCNC: 1.03 MG/DL (ref 0.7–1.3)
ERYTHROCYTE [DISTWIDTH] IN BLOOD BY AUTOMATED COUNT: 11.8 % (ref 11–14.5)
GFR SERPL CREATININE-BSD FRML MDRD: >60 ML/MIN/1.73M2
GLUCOSE BLD-MCNC: 165 MG/DL (ref 70–125)
HCT VFR BLD AUTO: 40.6 % (ref 40–54)
HGB BLD-MCNC: 13.7 G/DL (ref 14–18)
MCH RBC QN AUTO: 29.7 PG (ref 27–34)
MCHC RBC AUTO-ENTMCNC: 33.7 G/DL (ref 32–36)
MCV RBC AUTO: 88 FL (ref 80–100)
PLATELET # BLD AUTO: 174 THOU/UL (ref 140–440)
PMV BLD AUTO: 9.2 FL (ref 8.5–12.5)
POTASSIUM BLD-SCNC: 3.9 MMOL/L (ref 3.5–5)
RBC # BLD AUTO: 4.61 MILL/UL (ref 4.4–6.2)
SODIUM SERPL-SCNC: 138 MMOL/L (ref 136–145)
WBC: 8.4 THOU/UL (ref 4–11)

## 2021-04-05 ASSESSMENT — MIFFLIN-ST. JEOR: SCORE: 1790.92

## 2021-04-06 LAB
ATRIAL RATE - MUSE: 61 BPM
DIASTOLIC BLOOD PRESSURE - MUSE: NORMAL
INTERPRETATION ECG - MUSE: NORMAL
P AXIS - MUSE: 39 DEGREES
PR INTERVAL - MUSE: 220 MS
QRS DURATION - MUSE: 94 MS
QT - MUSE: 418 MS
QTC - MUSE: 420 MS
R AXIS - MUSE: -36 DEGREES
SYSTOLIC BLOOD PRESSURE - MUSE: NORMAL
T AXIS - MUSE: 137 DEGREES
VENTRICULAR RATE- MUSE: 61 BPM

## 2021-04-13 ENCOUNTER — RECORDS - HEALTHEAST (OUTPATIENT)
Dept: LAB | Facility: CLINIC | Age: 71
End: 2021-04-13

## 2021-04-13 LAB
ALBUMIN SERPL-MCNC: 4 G/DL (ref 3.5–5)
ALP SERPL-CCNC: 123 U/L (ref 45–120)
ALT SERPL W P-5'-P-CCNC: 22 U/L (ref 0–45)
ANION GAP SERPL CALCULATED.3IONS-SCNC: 11 MMOL/L (ref 5–18)
AST SERPL W P-5'-P-CCNC: 25 U/L (ref 0–40)
BILIRUB SERPL-MCNC: 1.8 MG/DL (ref 0–1)
BUN SERPL-MCNC: 20 MG/DL (ref 8–28)
CALCIUM SERPL-MCNC: 9 MG/DL (ref 8.5–10.5)
CHLORIDE BLD-SCNC: 108 MMOL/L (ref 98–107)
CHOLEST SERPL-MCNC: 147 MG/DL
CO2 SERPL-SCNC: 20 MMOL/L (ref 22–31)
CREAT SERPL-MCNC: 1.05 MG/DL (ref 0.7–1.3)
FASTING STATUS PATIENT QL REPORTED: NORMAL
GFR SERPL CREATININE-BSD FRML MDRD: >60 ML/MIN/1.73M2
GLUCOSE BLD-MCNC: 102 MG/DL (ref 70–125)
HDLC SERPL-MCNC: 44 MG/DL
LDLC SERPL CALC-MCNC: 77 MG/DL
POTASSIUM BLD-SCNC: 4.2 MMOL/L (ref 3.5–5)
PROT SERPL-MCNC: 6.8 G/DL (ref 6–8)
PSA SERPL-MCNC: 1.2 NG/ML (ref 0–6.5)
SODIUM SERPL-SCNC: 139 MMOL/L (ref 136–145)
TRIGL SERPL-MCNC: 132 MG/DL

## 2021-04-20 ENCOUNTER — COMMUNICATION - HEALTHEAST (OUTPATIENT)
Dept: CARDIOLOGY | Facility: CLINIC | Age: 71
End: 2021-04-20

## 2021-04-22 ENCOUNTER — AMBULATORY - HEALTHEAST (OUTPATIENT)
Dept: CARDIOLOGY | Facility: CLINIC | Age: 71
End: 2021-04-22

## 2021-04-22 DIAGNOSIS — I25.10 CORONARY ARTERY DISEASE INVOLVING NATIVE CORONARY ARTERY OF NATIVE HEART WITHOUT ANGINA PECTORIS: ICD-10-CM

## 2021-04-23 ENCOUNTER — AMBULATORY - HEALTHEAST (OUTPATIENT)
Dept: SURGERY | Facility: CLINIC | Age: 71
End: 2021-04-23

## 2021-04-23 DIAGNOSIS — Z11.59 ENCOUNTER FOR SCREENING FOR OTHER VIRAL DISEASES: ICD-10-CM

## 2021-04-30 ENCOUNTER — HOSPITAL ENCOUNTER (OUTPATIENT)
Dept: CARDIOLOGY | Facility: HOSPITAL | Age: 71
Discharge: HOME | End: 2021-04-30
Payer: COMMERCIAL

## 2021-04-30 ENCOUNTER — HOSPITAL ENCOUNTER (OUTPATIENT)
Dept: NUCLEAR MEDICINE | Facility: HOSPITAL | Age: 71
Discharge: HOME | End: 2021-04-30
Payer: COMMERCIAL

## 2021-04-30 DIAGNOSIS — I25.10 CORONARY ARTERY DISEASE INVOLVING NATIVE CORONARY ARTERY OF NATIVE HEART WITHOUT ANGINA PECTORIS: ICD-10-CM

## 2021-04-30 LAB
CV STRESS CURRENT BP HE: NORMAL
CV STRESS CURRENT HR HE: 75
CV STRESS CURRENT HR HE: 79
CV STRESS CURRENT HR HE: 80
CV STRESS CURRENT HR HE: 80
CV STRESS CURRENT HR HE: 84
CV STRESS CURRENT HR HE: 84
CV STRESS CURRENT HR HE: 88
CV STRESS CURRENT HR HE: 88
CV STRESS CURRENT HR HE: 89
CV STRESS CURRENT HR HE: 90
CV STRESS CURRENT HR HE: 94
CV STRESS CURRENT HR HE: 95
CV STRESS DEVIATION TIME HE: NORMAL
CV STRESS ECHO PERCENT HR HE: NORMAL
CV STRESS EXERCISE STAGE HE: NORMAL
CV STRESS FINAL RESTING BP HE: NORMAL
CV STRESS FINAL RESTING HR HE: 79
CV STRESS MAX HR HE: 97
CV STRESS MAX TREADMILL GRADE HE: 0
CV STRESS MAX TREADMILL SPEED HE: 0
CV STRESS PEAK DIA BP HE: NORMAL
CV STRESS PEAK SYS BP HE: NORMAL
CV STRESS PHASE HE: NORMAL
CV STRESS PROTOCOL HE: NORMAL
CV STRESS RESTING PT POSITION HE: NORMAL
CV STRESS ST DEVIATION AMOUNT HE: NORMAL
CV STRESS ST DEVIATION ELEVATION HE: NORMAL
CV STRESS ST EVELATION AMOUNT HE: NORMAL
CV STRESS TEST TYPE HE: NORMAL
CV STRESS TOTAL STAGE TIME MIN 1 HE: NORMAL
NUC STRESS EJECTION FRACTION: 60 %
RATE PRESSURE PRODUCT: NORMAL
STRESS ECHO BASELINE DIASTOLIC HE: 90
STRESS ECHO BASELINE HR: 77
STRESS ECHO BASELINE SYSTOLIC BP: 178
STRESS ECHO CALCULATED PERCENT HR: 65 %
STRESS ECHO LAST STRESS DIASTOLIC BP: 80
STRESS ECHO LAST STRESS HR: 89
STRESS ECHO LAST STRESS SYSTOLIC BP: 176
STRESS ECHO TARGET HR: 149

## 2021-04-30 ASSESSMENT — MIFFLIN-ST. JEOR: SCORE: 1772.77

## 2021-05-05 ENCOUNTER — COMMUNICATION - HEALTHEAST (OUTPATIENT)
Dept: CARDIOLOGY | Facility: CLINIC | Age: 71
End: 2021-05-05
Payer: COMMERCIAL

## 2021-05-06 ENCOUNTER — RECORDS - HEALTHEAST (OUTPATIENT)
Dept: LAB | Facility: CLINIC | Age: 71
End: 2021-05-06

## 2021-05-06 ENCOUNTER — COMMUNICATION - HEALTHEAST (OUTPATIENT)
Dept: CARDIOLOGY | Facility: CLINIC | Age: 71
End: 2021-05-06

## 2021-05-06 LAB
ANION GAP SERPL CALCULATED.3IONS-SCNC: 10 MMOL/L (ref 5–18)
BUN SERPL-MCNC: 19 MG/DL (ref 8–28)
CALCIUM SERPL-MCNC: 9.2 MG/DL (ref 8.5–10.5)
CHLORIDE BLD-SCNC: 104 MMOL/L (ref 98–107)
CO2 SERPL-SCNC: 26 MMOL/L (ref 22–31)
CREAT SERPL-MCNC: 1.17 MG/DL (ref 0.7–1.3)
GFR SERPL CREATININE-BSD FRML MDRD: >60 ML/MIN/1.73M2
GLUCOSE BLD-MCNC: 122 MG/DL (ref 70–125)
POTASSIUM BLD-SCNC: 4.3 MMOL/L (ref 3.5–5)
SODIUM SERPL-SCNC: 140 MMOL/L (ref 136–145)

## 2021-05-07 ENCOUNTER — AMBULATORY - HEALTHEAST (OUTPATIENT)
Dept: LAB | Facility: CLINIC | Age: 71
End: 2021-05-07

## 2021-05-07 DIAGNOSIS — Z11.59 ENCOUNTER FOR SCREENING FOR OTHER VIRAL DISEASES: ICD-10-CM

## 2021-05-08 LAB
SARS-COV-2 PCR COMMENT: NORMAL
SARS-COV-2 RNA SPEC QL NAA+PROBE: NEGATIVE
SARS-COV-2 VIRUS SPECIMEN SOURCE: NORMAL

## 2021-05-09 ENCOUNTER — COMMUNICATION - HEALTHEAST (OUTPATIENT)
Dept: SCHEDULING | Facility: CLINIC | Age: 71
End: 2021-05-09

## 2021-05-10 ENCOUNTER — RECORDS - HEALTHEAST (OUTPATIENT)
Dept: ADMINISTRATIVE | Facility: OTHER | Age: 71
End: 2021-05-10

## 2021-05-11 ENCOUNTER — RECORDS - HEALTHEAST (OUTPATIENT)
Dept: ADMINISTRATIVE | Facility: OTHER | Age: 71
End: 2021-05-11

## 2021-05-11 ENCOUNTER — ANESTHESIA - HEALTHEAST (OUTPATIENT)
Dept: SURGERY | Facility: CLINIC | Age: 71
End: 2021-05-11

## 2021-05-11 ENCOUNTER — SURGERY - HEALTHEAST (OUTPATIENT)
Dept: SURGERY | Facility: CLINIC | Age: 71
End: 2021-05-11
Payer: COMMERCIAL

## 2021-05-11 ASSESSMENT — MIFFLIN-ST. JEOR: SCORE: 1772.77

## 2021-05-27 VITALS — BODY MASS INDEX: 34.97 KG/M2 | BODY MASS INDEX: 34.97 KG/M2 | WEIGHT: 230 LBS | HEIGHT: 68 IN

## 2021-05-27 VITALS — BODY MASS INDEX: 34.97 KG/M2 | WEIGHT: 230 LBS

## 2021-05-28 ENCOUNTER — RECORDS - HEALTHEAST (OUTPATIENT)
Dept: ADMINISTRATIVE | Facility: CLINIC | Age: 71
End: 2021-05-28

## 2021-05-31 VITALS — WEIGHT: 210 LBS | BODY MASS INDEX: 31.1 KG/M2 | HEIGHT: 69 IN

## 2021-06-03 ENCOUNTER — RECORDS - HEALTHEAST (OUTPATIENT)
Dept: ADMINISTRATIVE | Facility: CLINIC | Age: 71
End: 2021-06-03

## 2021-06-04 VITALS — HEIGHT: 69 IN | BODY MASS INDEX: 31.1 KG/M2 | WEIGHT: 210 LBS

## 2021-06-04 VITALS — BODY MASS INDEX: 34.71 KG/M2 | WEIGHT: 229 LBS | HEIGHT: 68 IN

## 2021-06-04 NOTE — TELEPHONE ENCOUNTER
Called patient and reviewed results and recommendations. Patient reports he has been doing well, but has noticed and increase in shortness of breath with exertion. He exercises 4-5 days a week and has not seen decrease in his functional capacity. Patient notes he would work and achieve heart rates 170 bpm range. Advised patient to avoid strenuous activity and decrease his workload while exercising, until seen in clinic and results are discussed with Dr. Andre on 1/16. He should call with and sudden changes in symptoms or any other concerns. Direct contact information reviewed. -ejb

## 2021-06-04 NOTE — TELEPHONE ENCOUNTER
----- Message from Tico Andre MD sent at 12/30/2019 10:30 AM CST -----  Marianna,    If no symptoms, we can review at upcoming appt.If not feeling well, I should see him sooner to discuss.    Thanks,    Tico  ----- Message -----  From: Marianna Dias RN  Sent: 12/24/2019  10:29 AM CST  To: MD Dr. Thai Cohen, patient overdue for follow-up. This echo was done in anticipation of follow-up visit scheduled on 1/16/20. After review, any other recommendations? -ejb

## 2021-06-05 VITALS — BODY MASS INDEX: 35.46 KG/M2 | WEIGHT: 234 LBS | HEIGHT: 68 IN

## 2021-06-05 VITALS — WEIGHT: 230 LBS | HEIGHT: 68 IN | BODY MASS INDEX: 34.86 KG/M2

## 2021-06-05 NOTE — TELEPHONE ENCOUNTER
Chandler Eldridge  [address]  [place] [address]  [phone] (home)     Procedure cardiologist:  Dr. Almanza  PCP:  Naman Padron MD  H&P completed by:  Dr. Andre, 1/16/2020  Admit date 1/22/2020  Arrival time:  0630  Anticoagulation: None  CPAP: No  Previous PCI: No PCI, Angio in 2008  Bypass Grafts: No  Renal Issues: No  Diabetic?: No  Device?: No  Type:  n/a    Angiogram Teaching    Reason for Visit:  Telephone call to discuss pre-procedure education in preparation for: Cors poss PCI, R/LHC    Procedure Prep:  Primary Cardiologist note dated: 1/16/20  EKG results obtained, dated: am of procedure  Hemogram results obtained: am of procedure  Basic Metabolic Panel results obtained: am of procedure  Lipid Profile results obtained: 11/20/19    Patient Education    Pre-procedure instructions  Patient instructed to be NPO after midnight.  Patient instructed to shower the evening before or the morning of the procedure.  Patient instructed to arrange for transportation home following procedure from a responsible family member of friend. No driving for at least 24 hours.  Patient instructed to have a responsible adult with them for 24 hours post-procedure.  Post-procedure follow up process.  Conscious sedation discussed.    Pre-procedure medication instructions  Patient instructed on antiplatelet medication.  Continue medications as scheduled, with a small amount of water on the day of the procedure unless indicated.  Patient instructed to take 324 mg of Aspirin am of procedure: Yes  Other medication: instructed to only take Losartan a.m. of the procedure.    *PATIENTS RECORDS AVAILABLE IN [facility] UNLESS OTHERWISE INDICATED*      Patient Active Problem List   Diagnosis     Dyslipidemia     Coronary Artery Disease     Aortic Stenosis     Symptomatic cholelithiasis     Dyspnea on exertion     Atherosclerosis of native coronary artery of native heart without angina pectoris       Current Outpatient Medications    Medication Sig Dispense Refill     aspirin 81 MG EC tablet Take 81 mg by mouth daily.       atorvastatin (LIPITOR) 80 MG tablet Take 80 mg by mouth daily.       losartan (COZAAR) 50 MG tablet Take 100 mg by mouth daily.        nitroglycerin (NITROSTAT) 0.4 MG SL tablet Place 0.4 mg under the tongue as needed for chest pain.       No current facility-administered medications for this visit.        Allergies   Allergen Reactions     Penicillins Kate Dias

## 2021-06-05 NOTE — PATIENT INSTRUCTIONS - HE
Chandler Eldridge,    It was a pleasure to see you today at the [facility].     My recommendations after this visit include:    1.  Your aortic stenosis is become more severe in the last 2 years.  This may be related to some of your shortness of breath.  I would like you to be seen in the valve clinic.  If the appointment is out more than a month, we will likely schedule an angiogram prior to that.        Tico Andre

## 2021-06-06 NOTE — ANESTHESIA POSTPROCEDURE EVALUATION
Patient: Chandler Eldridge  Procedure(s):  Transfemoral Transcatheter Aortic Valve Replacement  TRANSFEMORAL TRANSCATHETER AORTIC VALVE REPLACEMENT (STANDBY)  Anesthesia type: MAC    Patient location: PACU  Last vitals:   Vitals Value Taken Time   /72 3/17/2020  4:46 PM   Temp 37  C (98.6  F) 3/17/2020  4:46 PM   Pulse 61 3/17/2020  4:55 PM   Resp 17 3/17/2020  4:55 PM   SpO2 95 % 3/17/2020  4:55 PM   Vitals shown include unvalidated device data.  Post vital signs: stable  Level of consciousness: awake  Post-anesthesia pain: pain controlled  Post-anesthesia nausea and vomiting: no  Pulmonary: unassisted  Cardiovascular: stable  Hydration: adequate  Anesthetic events: no    QCDR Measures:  ASA# 11 - Christelle-op Cardiac Arrest: ASA11B - Patient did NOT experience unanticipated cardiac arrest  ASA# 12 - Christelle-op Mortality Rate: ASA12B - Patient did NOT die  ASA# 13 - PACU Re-Intubation Rate: NA - No ETT / LMA used for case  ASA# 10 - Composite Anes Safety: ASA10A - No serious adverse event    Additional Notes:

## 2021-06-06 NOTE — PROGRESS NOTES
Valve Clinic - 2/27/2020  (See consult noted from Dr. Wolfe)    Referring provider: Dr. Tico Andre    Serum albumin (date completed 2/27/2020): 3.9  5 meter walk (date completed 2/27/2020): 4.26, 3.86, 3.99  Ureña index (date completed 2/27/2020): 6/6    MINI-COG Assessment:  Number of words recalled: 3/3  Result of CDT: 0/2  Result: 3/5     frailty score: 0/4      Preliminary STS Score: 0.92%      KCCQ12 (date completed 2/27/2020), scanned into chart.      PMH: Aortic valve stenosis, CAD, GERD, HTN, HLD, Spinal fusion surgery L1-S1 (2009), Left shoulder replacement (2018).    Pt of Dr. Andre was referred to valve clinic for aortic stenosis. Pt states since last summer he noticed he became shortness of breath while playing golf. Overtime pts symptoms have progressed and pt is also dizzy, fatigue and has a decline in his functional capacity.       TTE date 12/20  EF 68 %  AV mean gradient: 36 mmHg  AV Peak Knen: 4.08 m/sec  AV area: 1.1 cm2  AV DIM IND VTI: 0.3  LV SVi: 52.1 ml/m2    Comments on valves: Valve is tricuspid. NAZIA during cath is 0.8      Plan: Pt needs to have a CV surgery consult and obtain dental clearance. Pt will contact dentist for dental clearance.          Current Outpatient Medications   Medication Sig Note     aspirin 81 MG EC tablet Take 81 mg by mouth daily. 1/22/2020: Took 4 pre angio     atorvastatin (LIPITOR) 80 MG tablet Take 80 mg by mouth daily.      losartan (COZAAR) 100 MG tablet Take 100 mg by mouth at bedtime.      metoprolol succinate (TOPROL-XL) 25 MG Take 1 tablet (25 mg total) by mouth daily.          Leatha Moreno RN  [facility]  Valve Clinic Coordinator  Phone: [phone]  Fax: [phone]

## 2021-06-06 NOTE — PROGRESS NOTES
He had a recent CT as well, which was reviewed [facility] Heart Care Note    Assessment / Plan:    Mr Eldridge is presenting with severe aortic stenosis, for which he is becoming increasingly symptomatic, and will therefore benefit from aortic valve replacement.    The options of surgical as well as transcatheter aortic valve replacement were reviewed, and given his strong preference and favorable anatomy, he would be a reasonable candidate for a transcatheter approach.    The risks, benefits and alternatives of TAVR were reviewed, and he would like to proceed.    In the interim he has been asked to avoid heavy exertion, and knows to call if there is a change in his symptoms.      Thank you for the opportunity to participate in the care of Chandler Eldridge. Please do not hesitate to call with any questions or concerns regarding his cardiovascular status.    ______________________________________________________________________    Subjective:    It was a pleasure to see Chandler Eldridge at the [facility] at the request of Dr Andre for evaluation of treatment options for severe aortic stenosis.     Chandler Eldridge is a pleasant 70 y.o. male who is been known to have aortic valve disease for a number of years, historically mild to moderate aortic stenosis, but over the past year has had increasing symptoms of dyspnea on exertion as well as a declining functional capacity.    He had a repeat echocardiogram performed on December 20, 2019, that showed progression of his aortic valve disease to severe stenosis.  His left ventricular systolic function was normal to hyperdynamic with an EF of 65 to 70% and moderate concentric left ventricular hypertrophy.  The mean gradient on the echo was 36 mmHg, which was likely an underestimate based on the overall valve appearance and decreased leaflet excursion.    He had a right and left heart catheterization performed on January 22, 2020.  Which confirmed the presence  of severe aortic stenosis with a mean gradient of 43 mmHg , And an aortic valve area of 0.8 cm .  His pulmonary pressures were normal.  There was moderate disease seen in the mid RCA, with no other significant epicardial coronary artery disease.    He had a CTA performed as well, which shows a tricuspid, stenotic aortic valve with favorable transfemoral anatomy.  ______________________________________________________________________    Problem List:  Patient Active Problem List   Diagnosis     Dyslipidemia     Coronary Artery Disease     Aortic Stenosis     Symptomatic cholelithiasis     Dyspnea on exertion     Atherosclerosis of native coronary artery of native heart without angina pectoris       Medical History:  Past Medical History:   Diagnosis Date     Aortic stenosis      Coronary artery disease      Family history of myocardial infarction     mom dad     GERD (gastroesophageal reflux disease)      Hyperlipidemia      Hypertension      SOB (shortness of breath)        Surgical History:  Past Surgical History:   Procedure Laterality Date     CARDIAC CATHETERIZATION       CV CORONARY ANGIOGRAM N/A 1/22/2020    Procedure: Coronary Angiogram;  Surgeon: Jamar Almanza MD;  Location: [facility] Cath Lab;  Service: Cardiology     CV LEFT HEART CATHETERIZATION WO LEFT VETRICULOGRAM Left 1/22/2020    Procedure: Left Heart Catheterization Without Left Ventriculogram;  Surgeon: Jamar Almanza MD;  Location: [facility] Cath Lab;  Service: Cardiology     CV RIGHT HEART CATH N/A 1/22/2020    Procedure: Right Heart Catheterization;  Surgeon: Jamar Almanza MD;  Location: [facility] Cath Lab;  Service: Cardiology     FOOT SURGERY Bilateral      HERNIA REPAIR  2002    BIHR done by Dr. Schmidt     LUMBAR SPINE SURGERY  2004     SPINAL FUSION  2009     SPINAL FUSION Bilateral 2009     TOTAL SHOULDER REPLACEMENT Left 11/2018       Social History:  Social History     Socioeconomic History     Marital status:       Spouse name: Not on file     Number of children: Not on file     Years of education: Not on file     Highest education level: Not on file   Occupational History     Not on file   Social Needs     Financial resource strain: Not on file     Food insecurity:     Worry: Not on file     Inability: Not on file     Transportation needs:     Medical: Not on file     Non-medical: Not on file   Tobacco Use     Smoking status: Never Smoker     Smokeless tobacco: Never Used   Substance and Sexual Activity     Alcohol use: Yes     Comment: rare     Drug use: No     Sexual activity: Not on file   Lifestyle     Physical activity:     Days per week: Not on file     Minutes per session: Not on file     Stress: Not on file   Relationships     Social connections:     Talks on phone: Not on file     Gets together: Not on file     Attends Mandaen service: Not on file     Active member of club or organization: Not on file     Attends meetings of clubs or organizations: Not on file     Relationship status: Not on file     Intimate partner violence:     Fear of current or ex partner: Not on file     Emotionally abused: Not on file     Physically abused: Not on file     Forced sexual activity: Not on file   Other Topics Concern     Not on file   Social History Narrative     Not on file       Review of Systems: 12 organ system review done and negative except as noted in the HPI.    Family History:  Family History   Problem Relation Age of Onset     Coronary artery disease Mother      Coronary artery disease Father          Allergies:  Allergies   Allergen Reactions     Penicillins Hives       Medications:  Current Outpatient Medications   Medication Sig Dispense Refill     aspirin 81 MG EC tablet Take 81 mg by mouth daily.       atorvastatin (LIPITOR) 80 MG tablet Take 80 mg by mouth daily.       losartan (COZAAR) 100 MG tablet Take 100 mg by mouth at bedtime.       metoprolol succinate (TOPROL-XL) 25 MG Take 1 tablet (25 mg  "total) by mouth daily. 30 tablet 12     No current facility-administered medications for this visit.        Objective:   Vital signs:  /78 (Patient Site: Left Arm, Patient Position: Sitting, Cuff Size: Adult Large)   Pulse 63   Resp 14   Ht 5' 8\" (1.727 m)   Wt (!) 238 lb (108 kg)   BMI 36.19 kg/m        Physical Exam:    GENERAL APPEARANCE: Alert, cooperative and in no acute distress.  HEENT: No scleral icterus. No Xanthelasma. Oral mucuos membranes pink and moist.  NECK: No JVD. Thyroid not visualized  CHEST: clear to auscultation  CARDIOVASCULAR: S1, S2 regular. 2-3/6 BRIGETTE  PULSES: Radial and posterior tibial pulses are intact and symmetric.   ABDOMEN: Nontender. BS+.   EXTREMITIES: No cyanosis, clubbing or edema.  SKIN: Warm, well perfused  NEURO: Grossly nonfocal    Lab Results:  LIPIDS:  Lab Results   Component Value Date    CHOL 159 11/20/2019    CHOL 126 06/26/2018    CHOL 121 04/11/2017     Lab Results   Component Value Date    HDL 43 11/20/2019    HDL 45 06/26/2018    HDL 46 04/11/2017     Lab Results   Component Value Date    LDLCALC 88 11/20/2019    LDLCALC 66 06/26/2018    LDLCALC 58 04/11/2017     Lab Results   Component Value Date    TRIG 140 11/20/2019    TRIG 77 06/26/2018    TRIG 84 04/11/2017     No components found for: CHOLHDL    BMP:  Lab Results   Component Value Date    CREATININE 1.18 02/27/2020    BUN 16 02/27/2020     02/27/2020    K 4.3 02/27/2020     02/27/2020    CO2 28 02/27/2020         ECG, Echo and Cath films independently reviewed      ADOLFO EMERSON MD  [facility] HEART [facility]              "

## 2021-06-06 NOTE — TELEPHONE ENCOUNTER
"Call from pt regarding change in chest discomfort. For the past 6 mths he's described it as a \"pencil poke\" this morning when he awoke he noticed a change, now it's more like a constant bruise feeling under his left breast. He denies any shortness of breath, N/V, it does not radiate. He also denies any neurological deficits. He goes on to state that yesterday he and his wife walked 2-3 miles, he's been reading about his upcoming procedure on the [facility]'s site as well as the new coverage on the coronavirus has him feeling quite anxious.   I reviewed with Dr. Wolfe, no changes were recommended. Pt was offered reassurance, instructed patient to take it easy until next week and call if symptoms worsen.  Pt verbalizes understanding.   "

## 2021-06-06 NOTE — TELEPHONE ENCOUNTER
I called patient to discuss cancelling his pre-op for TAVR on Monday due to the Covid-19 outbreak and taking extra precaution to limit exposures.     He understands and knows that he will be our first case on Tuesday.    He will plan to arrive at 5:30 am and is aware that  will be unavailable.     We did discuss his medications and he does not take any in the morning.    He is aware to remain NPO after midnight.     He reiterated his chest pain that he had felt yesterday and he is concerned about that. He has anxiety about postponing his TAVR but understands our concern as well.    I will update him with any news as I hear it but the plan is to proceed with TAVR on Tuesday.     EAN Drew

## 2021-06-06 NOTE — PROGRESS NOTES
Cardiothoracic Surgery Consult    Date of Service: 3/9/2020    REFERRING CARDIOLOGIST: Krzysztof Wolfe MD    REASON FOR CONSULTATION: Severe, symptomatic aortic valve stenosis     HISTORY OF PRESENT ILLNESS: Mr. Chandler Eldridge is a 70 y.o. year-old male who presents with severe, symptomatic aortic valve stenosis. His symptoms mostly include easy fatigability and shortness of breath but he does have chest pain on occasion. He denies syncope or presyncope. He has noted a progression of his symptoms in the last several weeks but continues to remain active.   .   PAST MEDICAL HISTORY:   Past Medical History:   Diagnosis Date     Aortic stenosis      Coronary artery disease      Family history of myocardial infarction     mom dad     GERD (gastroesophageal reflux disease)      Hyperlipidemia      Hypertension      SOB (shortness of breath)        PAST SURGICAL HISTORY:   Past Surgical History:   Procedure Laterality Date     CARDIAC CATHETERIZATION       CV CORONARY ANGIOGRAM N/A 1/22/2020    Procedure: Coronary Angiogram;  Surgeon: Jamar Almanza MD;  Location: [facility] Cath Lab;  Service: Cardiology     CV LEFT HEART CATHETERIZATION WO LEFT VETRICULOGRAM Left 1/22/2020    Procedure: Left Heart Catheterization Without Left Ventriculogram;  Surgeon: Jamar Almanza MD;  Location: [facility] Cath Lab;  Service: Cardiology     CV RIGHT HEART CATH N/A 1/22/2020    Procedure: Right Heart Catheterization;  Surgeon: Jamar Almanza MD;  Location: [facility] Cath Lab;  Service: Cardiology     FOOT SURGERY Bilateral      HERNIA REPAIR  2002    BIHR done by Dr. Schmidt     LUMBAR SPINE SURGERY  2004     SPINAL FUSION  2009     SPINAL FUSION Bilateral 2009     TOTAL SHOULDER REPLACEMENT Left 11/2018       ALLERGIES:   Allergies   Allergen Reactions     Penicillins Hives          CURRENT MEDICATIONS:  Current Outpatient Medications on File Prior to Visit   Medication Sig Dispense Refill     aspirin 81 MG EC  tablet Take 81 mg by mouth daily.       atorvastatin (LIPITOR) 80 MG tablet Take 80 mg by mouth daily.       losartan (COZAAR) 100 MG tablet Take 100 mg by mouth at bedtime.       metoprolol succinate (TOPROL-XL) 25 MG Take 1 tablet (25 mg total) by mouth daily. 30 tablet 12     No current facility-administered medications on file prior to visit.          FAMILY HISTORY:   Family History   Problem Relation Age of Onset     Coronary artery disease Mother      Coronary artery disease Father      The family history was reviewed and is not pertinent to the patient's disease/illness    SOCIAL HISTORY:   Social History     Socioeconomic History     Marital status:      Spouse name: Not on file     Number of children: Not on file     Years of education: Not on file     Highest education level: Not on file   Occupational History     Not on file   Social Needs     Financial resource strain: Not on file     Food insecurity     Worry: Not on file     Inability: Not on file     Transportation needs     Medical: Not on file     Non-medical: Not on file   Tobacco Use     Smoking status: Never Smoker     Smokeless tobacco: Never Used   Substance and Sexual Activity     Alcohol use: Yes     Comment: rare     Drug use: No     Sexual activity: Not on file   Lifestyle     Physical activity     Days per week: Not on file     Minutes per session: Not on file     Stress: Not on file   Relationships     Social connections     Talks on phone: Not on file     Gets together: Not on file     Attends Mandaen service: Not on file     Active member of club or organization: Not on file     Attends meetings of clubs or organizations: Not on file     Relationship status: Not on file     Intimate partner violence     Fear of current or ex partner: Not on file     Emotionally abused: Not on file     Physically abused: Not on file     Forced sexual activity: Not on file   Other Topics Concern     Not on file   Social History Narrative     Not  on file       REVIEW OF SYSTEMS:  A complete 10 point review of systems was obtained and is negative other than the above stated complaints    PHYSICAL EXAMINATION:   Vitals:   Vitals:    03/09/20 1352   BP: 180/82   Pulse: (!) 56   Resp: 16     GENERAL: Well developed and well nourished   HEENT: EOMI, and conjunctiva anicteric and sclera clear   NECK: No JVD, trachea midline  CARDIOVASCULAR: sinus bradycardia, systolic murmur heard best at upper sternal border  RESPIRATIONS: no increased work of breathing  ABDOMEN: soft, NT, ND  EXTREMITIES: no deformity or swelling   NEUROLOGIC: intact and symmetric with no focal deficits.   PSYCHIATRIC: alert and oriented x3, pleasant       LABORATORY STUDIES:   Lab Results   Component Value Date    WBC 7.5 02/27/2020    HGB 14.8 02/27/2020    HCT 43.3 02/27/2020    MCV 89 02/27/2020     02/27/2020      Lab Results   Component Value Date    CREATININE 1.18 02/27/2020    BUN 16 02/27/2020     02/27/2020    K 4.3 02/27/2020     02/27/2020    CO2 28 02/27/2020     No results found for: HGBA1C  EKG: NSR rate 63.    CARDIAC CATHETERIZATION: This study was personally reviewed by me  Findings:  LM:no obstruction  LAD:20-30% proximal/mid-vessel irregularity  Lcx:mildly irregular  RCA:dominant, mid-vessel 60% narrowing, similar to prior angio     LVEDP:11     RHC:  RA:3   RV:23/5  PA:23/16 (19)  PCWP:16     AV gradient simultaneous: 72/53  AV gradient mean, pullback: 43     SvO2:72  Ao sat: 95%     CO/CI:  Marta:5.6/2.6    TRANSTHORACIC ECHOCARDIOGRAM: This study was personally reviewed by me    1.Left ventricle ejection fraction is normal. The calculated left ventricular ejection fraction is 68%.    2.TAPSE is normal, which is consistent with normal right ventricular systolic function.    3.The aortic root is mildly dilated.    4.There is moderate global thickening of the aortic valve. Moderate to severe aortic stenosis. Mild aortic regurgitation. Mean gradient 36 mmHg  with mean velocity 2.8 m/s. Visually valve appears more stenotic than hemodynamics suggest.    5.When compared to the previous study dated 10/3/2017, the aortic stenosis is worsened, prior mean gradient was 19 mmHg, valve today appears less mobile.    IMPRESSION AND PLAN: Mr. Chandler Eldridge is a 70 y.o. Year-old male  with severe aortic stenosis. Echocardiography demonstrates preserved function with an LVEF of 68% , and coronary angiography demonstrates moderate RCA disease and mild LAD disease.  I recommend aortic valve replacement. I discussed the technical details of the open surgical AVR with the patient, as well as the expected postoperative course and recovery. The risks include but are not limited to bleeding, infection, stroke, heart or graft failure, dysrhythmia, respiratory failure, kidney or liver injury, bowel or limb ischemia, and death. His calculated STS risk for mortality with isolated aortic valve replacement is 1%. He is a good candidate for both SAVR and TAVR but desires short recovery and I therefore agree he is a good candidate for transcatheter aortic valve replacement. I discussed the technical details of this with him as well. He understands that there is a risk of bleeding, infection, stroke, heart block requiring permanent pacemaker, cardiac perforation, aortic root rupture and aortic dissection, in addition to risk of death. He wishes to proceed with scheduling for TAVR.      Thank you very much for this referral.       Linden Grover 3/9/2020 7:49 AM

## 2021-06-06 NOTE — TELEPHONE ENCOUNTER
I spoke to patient and he would like to proceed with his TAVR tomorrow.   Will plan on him being the 1st patient. I have ordered pre-op orders to be done and he will need his labs drawn upon arrival.     EAN Drew

## 2021-06-06 NOTE — ANESTHESIA CARE TRANSFER NOTE
Last vitals:   Vitals:    03/17/20 0939   BP: 132/65   Pulse: 66   Resp: 12   Temp: 37  C (98.6  F)   SpO2: 99%     Patient's level of consciousness is awake  Spontaneous respirations: yes  Maintains airway independently: yes  Dentition unchanged: yes  Oropharynx: oropharynx clear of all foreign objects    QCDR Measures:  ASA# 20 - Surgical Safety Checklist: WHO surgical safety checklist completed prior to induction    PQRS# 430 - Adult PONV Prevention: 4558F - Pt received => 2 anti-emetic agents (different classes) preop & intraop  ASA# 8 - Peds PONV Prevention: NA - Not pediatric patient, not GA or 2 or more risk factors NOT present  PQRS# 424 - Christelle-op Temp Management: 4559F - At least one body temp DOCUMENTED => 35.5C or 95.9F within required timeframe  PQRS# 426 - PACU Transfer Protocol:NA - Patient did not go to PACU  ASA# 14 - Acute Post-op Pain: ASA14B - Patient did NOT experience pain >= 7 out of 10

## 2021-06-06 NOTE — ANESTHESIA PREPROCEDURE EVALUATION
Anesthesia Evaluation      Patient summary reviewed   No history of anesthetic complications     Airway   Mallampati: II  Neck ROM: full   Pulmonary - negative ROS and normal exam   (-) shortness of breath                         Cardiovascular - normal exam  (+) hypertension, valvular problems/murmurs AS, CAD, , hypercholesterolemia,     ECG reviewed        Neuro/Psych - negative ROS     Endo/Other    (+) obesity (BMI 36),      GI/Hepatic/Renal    (+) GERD well controlled,             Dental - normal exam                        Anesthesia Plan  Planned anesthetic: MAC    ASA 3   Induction: intravenous   Anesthetic plan and risks discussed with: patient    Post-op plan: routine recovery

## 2021-06-07 NOTE — PROGRESS NOTES
ITP ASSESSMENT   Assessment Day: 30 Day    Session Number: 8  Precautions: s/p TAVR, HTN, 60% mRCA disease, Shoulder restrictions from L side repair and lack of ROM on R side     Diagnosis: Valve    Risk Stratification: High    Referring Provider: Nmaan Padron MD  EXERCISE  Exercise Assessment: Discharge    Unable to complete post 6 min walk as pt came in for surprise last day.                          Exercise Plan  Goals Next 30 days  ADL'S: Goal #1:Pt to climb up 1 flight of 13 stairs at home without any fatigue. Utilize stairs in cardiac rehab.     Leisure: Goal #2: Pt to resume vacuuming 1 room ~5-10 minutes without any sx/sx. Reach a 4 MET level.     Work: LTG: Pt to resume outdoor bicycling ~10-15 miles (40 minutes) without any cv sx/sx. Reach a 6 MET level.       Education Goals: Patient can state cardiac s/s and appropriate emergency response.    Education Goals Met: Medication review.;Has system for taking medication.                          Goals Met  Initial ADL's goals met: Goal met- pt has resumed climbing without sx.    Initial Leisure goals met: Goal partially met- pt reports he has resumed vacuuming, although he has not reached 4 METs in rehab.    Intial Work goals met: Goal partially met- pt has resumed biking, but only for 20-30 mins, and has not reached 6 METs in rehab.    Initial Progression: Pt is being d/c on his own request.  He has reached peak of 3.6 METs for 40 mins on TM.  He is walking daily at home, for 25 mins, 1-2x/day.  Pt does not have any limitations.      Exercise Prescription  Exercise Mode: Treadmill;Bike;Nustep;Arm Erg.;Stairs;Hallway Walking    Frequency: 1-2x/week due to COVID    Duration: 30-40 minutes    Intensity / THR: 20-30 beats above resting heart rate    RPE 11-14  Progression / Met level: 3.1-4+    Resistive Training?: Yes (If interested but pt does have R shoulder problems and wt re)      Current Exercise (mins/week): 420      Interventions  Home  "Exercise:  Mode: Walking, biking    Frequency: 5-7x/week    Duration: 1-2x/day      Education Material : Educational videos;Provide written material;Individual education and counseling;Offer educational classes      Education Completed  Exercise Education Completed: Cardiac Anatomy;Signs and Symptoms;Medication review;RPE;Emergency Plan;Home Exercise;Warm up/cool down;FITT Principles;BP/HR Reponse to exercise;Stretching;Strength training;Benefits of Exercise;End point of exercise              Exercise Follow-up/Discharge  Follow up/Discharge: Pt reached peak of 3.6 METs.  He has been walking/biking daily at home.  He reports he will continue exercising at home.           NUTRITION  Nutrition Assessment: Discharge      Nutrition Risk Factors:  Nutrition Risk Factors: Dyslipidemia;Overweight  Cholesterol: 159 (11/20/2019)  LDL: 88  HDL: 43  Triglycerides: 140      Nutrition Plan  Interventions  Diet Consult: Declined    Other Nutrition Intervention: Therapist/Pt Discussion;Provide with Written Material    Initial Rate Your Plate Score: 53      Education Completed  Nutrition Education Completed: Low Saturated fat diet;Risk factor overview;Low sodium diet;Weight management      Goals  Nutrition Goals (Next 30 days): Patient can identify their risk factors for CAD;Patient will follow a low sodium diet;Patient knows appropriate portion size;Patient will lose weight;Improve Rate Your Plate Survey Score      Goals Met  Nutrition Goals Met: Completed Nutritional Risk Screen;Provided Rate your Plate Survey;Reviewed Dietitian schedule;Patient states following a low saturated fat diet      Height, Weight, and  BMI  Weight: 229 lb (103.9 kg)  Height: 5' 8\" (1.727 m)  BMI: 34.83      Nutrition Follow-up  Follow-up/Discharge: Pt reports he has continued to stick with the dietary changes he has made, including not eating out, increasing intake of fruits and vegebales, decreasing red meat, and increasing intake of chicken and fish.  " He has also changed from white bread to multi-grain bread.  He reports eating pizza 2x/month.  He haa lost 10 lbs on his home scale since his valve procedure.       Other Risk Factors  Other Risk Factor Assessment: Discharge      HTN Risk Factor: Hypertension      Pre Exercise BP: 138/68  Post Exercise BP: 124/64      Hypertension Plan  Goals  HTN Goals: Follow low sodium diet      Goals Met  HTN Goals Met: Take medication as prescribed;Exercises regularly      HTN Interventions  HTN Interventions: Therapist/patient discussion;Provide written material;Offer educational classes      HTN Education Completed  HTN Education Completed: Low sodium diet;Medication review;Risk factor overview      Tobacco Risk Factor: NA        Risk Factor Follow-up   Follow-up/Discharge: Pt continues to be compliant with his meds.  His BP has been somewhat elevated, but he had amlodipine added a couple weeks ago.         PSYCHOSOCIAL  Psychosocial Assessment: Discharge    Unable to complete post-program surveys.     Psychosocial Risk Factor: Stress      Psychosocial Plan  Interventions  Interventions: Offer educational videos and classes;Provide written material;Individual education and counseling      Education Completed  Education Completed: Relaxation/Coping Techniques;S/S of depression;Effects of stress on body      Goals  Goals (Next 30 days): Improvement in [employer] COOP score;Practicing stress management skills      Goals Met  Goals Met: Identified Support system;Oriented to stress management classes;Identify stressors      Psychosocial Follow-up  Follow-up/Discharge: Pt denies stress.  He reports feeling moderate stress a few weeks ago about his daughter's job status, but that has been resolved.  He reports having a good support system.  He exercises and golfs for stress mgmt.           Patient involved in Goal setting?: Yes      Signature: _____________________________________________________________    Date:  __________________    Time: __________________

## 2021-06-07 NOTE — PROGRESS NOTES
Review of Systems - Negative except   General ROS: positive for  - weight loss      Concerns about bundle block of EKG done when being discharged.    Jonna Singh, CMA

## 2021-06-07 NOTE — PROGRESS NOTES
[facility] Home Exercise Program/Discharge Summary  You have reached a 3.6 MET level and have completed 8 sessions of Cardiac Rehab.   Exercise Goals:   4-6x/week for aerobic exercise 30-60 minutes and 2-3x/week for strength training.   Modality Duration Intensity/  Rate of Perceived Exertion  OMNI Scale (1-10)   Warm-up 5 minutes 2-3   Walk 20-30 mins 4-7   Bike 20-30 mins 4-7   Treadmill 30-45 mins 2.9mph/1%         Cool Down 5 minutes 2-3   Strength Training *every other day 10-15 minutes 1-3 sets of 10 reps  Increase weights as tolerated.   Stretching 5 minutes 2-3   Continuous Exercise Heart Rate Guidelines:   20-30bpm> RHR (Resting Heart Rate)    Special Recommendations:    Continue to follow low fat, low salt, heart healthy diet.    Continue to follow up with your doctors/providers as recommended (e.g cholesterol).    A well rounded exercise program will included aerobic/cardiovascular exercise (e.g like walking, biking, or swimming ), strength training (e.g. free weights, exercise bands, or weight machines) and stretching program.   Stop Exercise!!! If any of the following occur:    Angina/chest pain    Dizziness    Excessive perspiration/cold sweats    Abnormal shortness of breath    Changes in heart rate (slow, fast, irregular)    Sudden fatigue or numbness    Nausea  Also...    Avoid extreme temperatures - exercise indoors if necessary:   Temp+ Humidity >160, Temp-Wind Chill <20    Wait at least 1 hour after a meal before strenuous activity    Do not exercise if you have a fever or are ill    Wear comfortable, supportive athletic clothing and shoes.  You are now on your way to a heart healthy lifestyle on your own. You can do it!

## 2021-06-07 NOTE — PROGRESS NOTES
Got communication from cardiac rehab staff regarding uncontrolled BPs during session.  Also with some T-wave inversion on tele strips.     Will start amlodipine 10 mg daily.  [facility] strips [facility] Miller [facility]  Structural Heart Program  [facility]

## 2021-06-07 NOTE — PROGRESS NOTES
Cardiac Rehab  Phase II Assessment    Assessment Date: 4/1/2020    Diagnosis: Severe Aortic Stenosis    Procedure: TAVR  Date of Onset:  3/17/2020  ICD/Pacemaker: No   Post-op Complications: New LBBB and RBBB w/bifasicular block and 1'AVB- resolved but then returned by d/c, hypomagnesia-supplemented, Allergic reaction to vancomycin- resolved  ECG History: SR w/T wave abnormality, PVC's, then post op new LBBB and RBBB w/bifasicular block    EF%:63 % (3/18/2020)  Past Medical History:   Past Medical History:   Diagnosis Date     Aortic stenosis      Coronary artery disease      Family history of myocardial infarction     mom dad     GERD (gastroesophageal reflux disease)      Hyperlipidemia      Hypertension      SOB (shortness of breath)      Past Surgical History:   Procedure Laterality Date     CARDIAC CATHETERIZATION       CV CORONARY ANGIOGRAM N/A 1/22/2020    Procedure: Coronary Angiogram;  Surgeon: Jamar Almanza MD;  Location: [facility] Cath Lab;  Service: Cardiology     CV LEFT HEART CATHETERIZATION WO LEFT VETRICULOGRAM Left 1/22/2020    Procedure: Left Heart Catheterization Without Left Ventriculogram;  Surgeon: Jamar Almanza MD;  Location: [facility] Cath Lab;  Service: Cardiology     CV RIGHT HEART CATH N/A 1/22/2020    Procedure: Right Heart Catheterization;  Surgeon: Jamar Almanza MD;  Location: [facility] Cath Lab;  Service: Cardiology     CV TRANSFEMORAL TRANSCATHETER VALVE REPLACEMENT N/A 3/17/2020    Procedure: Transfemoral Transcatheter Aortic Valve Replacement;  Surgeon: Jamar Almanza MD;  Location: [facility] Cath Lab;  Service: Cardiology     FOOT SURGERY Bilateral      HERNIA REPAIR  2002    BIHR done by Dr. Schmidt     LUMBAR SPINE SURGERY  2004     SPINAL FUSION  2009     SPINAL FUSION Bilateral 2009     TOTAL SHOULDER REPLACEMENT Left 11/2018     Patient Active Problem List   Diagnosis     Dyslipidemia     Coronary Artery Disease     Aortic Stenosis     Symptomatic  cholelithiasis     Dyspnea on exertion     Coronary artery disease involving native coronary artery of native heart without angina pectoris     Severe aortic stenosis     S/P TAVR (transcatheter aortic valve replacement)     Essential hypertension   Angio on 1/22- 60% mRCA disease and mild LAD disease  Mild dilated aortic root     Physical Assessment  Precautions/ Physical Limitations: 60% mRCA disease, mild dilated aortic root, TAVR precautions,  R shoulder needs replacement - lack of ROM, h/o spinal fusion-lack of flexibility, L shoulder weight restrictions due to surgery shouler   Oxygen: No  O2 Sats: 97% RA at rest  Lung Sounds: Clear in all lobes posterior  Edema: None  Incisions: B groin healing well per patient   Sleeping Pattern: good   Appetite: good   Nutrition Risk Screen: Completed    Pain    Intensity: (0-10 scale) 0      Psychosocial/ Emotional Health  1. In the past 12 months, have you been in a relationship where you have been abused physically, emotionally, sexually or financially? No  notified: NA  2. Who do you turn to for emotional support?: Nilda- wife  3. Do you have cultural or spiritual needs? No  4. Have there been any major life changes in the past 12 months? Yes , heart surgery     Referral Information  Primary Physician: Naman Padron MD  Cardiologist: Dr. PONCHO Andre  Surgeon: Dr. Almanza, Dr. Wolfe, Dr. Albert    [place] exercise/Equipment: Treadmill, Recumbant Bike, Airdyne- doesn't use due to shoulder restrictions, Nordik Track stationary bike     Patient's long-term goal(s): 1. Decrease blood pressure 2. Increase endurance and stamina     1. Living Accommodations: Home Steps: Yes- 2 flights of 13 stairs       Support people at home: Nilda   2. Marital Status:   3. Family is able to assist with cares      Taoist/Community involvement: none  4. Recreation/Hobbies: Golfing, Bicycling, Walking, Yard Work

## 2021-06-07 NOTE — PROGRESS NOTES
Review of Systems - History obtained from the patient  General ROS: positive for  - weight loss  Psychological ROS: negative  Ophthalmic ROS: negative  ENT ROS: negative  Allergy and Immunology ROS: negative  Hematological and Lymphatic ROS: negative  Endocrine ROS: negative  Respiratory ROS: negative  Cardiovascular ROS: negative  Gastrointestinal ROS: negative  Genito-Urinary ROS: negative  Musculoskeletal ROS: negative  Neurological ROS: negative  Dermatological ROS: negative

## 2021-06-07 NOTE — TELEPHONE ENCOUNTER
I called the patient and instructed him to take amlodipine 10 mg daily, along with his other medications. He will monitor BPs daily and will bring BP record to cardiac rehab on Friday, along with his BP machine to check for accuracy of recordings. Pt instructed to call with questions.    Dakota Hooks RN  [facility]  [phone]  04/01/20  4:09 PM      ----- Message from Leticia Miller PA-C sent at 4/1/2020  3:18 PM CDT ----------------------    Regarding: FW: Rhythm and BP's of 1st exercise session in cardiac rehab  I am going to send in Rx for amlodipine 10 mg daily for Ren.  Can you let him know?  Am discussing the rhythm findings with Leticia Whitlock    ----- Message -=-------------------------------------------------------------------------------------------------------------  From: Nicolette Oliva RN  Sent: 4/1/2020  10:11 AM CDT  To: Leticia Miller PA-C  Subject: Rhythm and BP's of 1st exercise session in [facility],    Our mutual pt just initiated cardiac rehab today s/p TAVR and I just wanted to give you a couple updates. You did a virtual visit with him on 3/25 and you initiated his metoprolol at 12.5mg daily. He arrived today and EKG showed SR w/1'AVB w/ T wave inversion in inferior leads - no BBB at a HR of ~70bpm and w/6'walk he did have a triplet w/ a short run of bigeminy PVC's (EKG's scanned in under [facility]- Cardiac Rehab ECG). I also did want to let you know of his bp readings. At rest he was 148/78 and w/6'walk he was 196/78 w/out any symptoms and rated the walk fairly light. His recovery bp was 148/78 and he does note when he checks his bp at home 3x/day it is running in the 140's rigo 70's. Pt is taking bp meds at night and is compliant with these. I just wanted to let you know his readings and he is also concerned with how high his bp is. He is also using a recumbant bike and/or stationary bike at home and he  did report you okay'd this so I wanted to make sure we were able to do so in cardiac rehab (d/c note does say no biking for 1 month).    Thanks so much!  Nicolette Oliva RN  [facility]

## 2021-06-07 NOTE — PROGRESS NOTES
ITP ASSESSMENT   Assessment Day: Initial    Session Number: 1/2  Precautions: s/p TAVR, HTN, 60% mRCA disease, Shoulder restrictions from L side repair and lack of ROM on R side    Diagnosis: Valve    Risk Stratification: High    Referring Provider: Krzysztof Wolfe MD   ITP: Dr. Burris  EXERCISE  Exercise Assessment: Initial       6 Minute Walk Test   Pre   Pre Exercise HR: 68                    Pre Exercise BP: 148/78      Peak  Peak HR: 100                   Peak BP: 196/78    Peak feet: 1400    Peak O2 SAT: 98    Peak RPE: 4    Peak MPH: 2.65      Symptoms:  Peak Symptoms: denies cv sx/sx       5 mins. Post  5 Min Post HR: 74    5 Min Post BP: 148/78                           Exercise Plan  Goals Next 30 days  ADL'S: Goal #1:Pt to climb up 1 flight of 13 stairs at home without any fatigue. Utilize stairs in cardiac rehab.     Leisure: Goal #2: Pt to resume vacuuming 1 room ~5-10 minutes without any sx/sx. Reach a 4 MET level.     Work: LTG: Pt to resume outdoor bicycling ~10-15 miles (40 minutes) without any cv sx/sx. Reach a 6 MET level.         Education Goals: Patient can state cardiac s/s and appropriate emergency response.    Education Goals Met: Medication review.;Has system for taking medication.      Exercise Prescription  Exercise Mode: Treadmill;Bike;Nustep;Arm Erg.;Stairs;Hallway Walking    Frequency: 1-2x/week due to COVID    Duration: 30-40 minutes    Intensity / THR: 20-30 beats above resting heart rate    RPE 11-14  Progression / Met level: 3.1-4+    Resistive Training?: Yes (If interested but pt does have R shoulder problems and wt re)      Current Exercise (mins/week): 420 (walking and biking)      Interventions  Home Exercise:  Mode: Walking, Recumbant Bike (pt reports he recieved okay to bike from PA)    Frequency: 3-5x/week     Duration: 20-30 minutes      Education Material : Educational videos;Provide written material;Individual education and counseling;Offer educational classes (PH 2  Folder reviewed and given to patient. )      Education Completed  Exercise Education Completed: Cardiac Anatomy;Signs and Symptoms;Medication review;RPE;Emergency Plan;Home Exercise;Warm up/cool down;FITT Principles;BP/HR Reponse to exercise;Stretching;Strength training;Benefits of Exercise;End point of exercise              Exercise Follow-up/Discharge  Follow up/Discharge: Skilled therapy needed to monitor pt s/p TAVR for any changes in EKG due to post op LBBB and RBBB and also PVC's on initial intake. Pt also needs to be monitored due to his hypertension and medication adjustments. Pt is a very active gentleman and is feeling very good since his surgery but continue to review pacing himself and he is motivated to participate in cardiac rehab to regain confidence by monitoring his EKG and BP especially when exercising. Pt's LTG is 6 MET level due to resuming outdoor bicycling. Contineu to educate and safely progress. Pt does have CAD so continue to monitor if pt does have any symptoms.    NUTRITION  Nutrition Assessment: Initial      Nutrition Risk Factors:  Nutrition Risk Factors: Dyslipidemia;Overweight  Cholesterol: 159 (11/20/2019)  LDL: 88  HDL: 43  Triglycerides: 140      Nutrition Plan  Interventions  Diet Consult: NA (Initial session. )    Other Nutrition Intervention: Therapist/Pt Discussion;Provide with Written Material      Education Completed  Nutrition Education Completed: Low Saturated fat diet;Risk factor overview;Low sodium diet;Weight management      Goals  Nutrition Goals (Next 30 days): Patient can identify their risk factors for CAD;Patient will follow a low sodium diet;Patient knows appropriate portion size;Patient will lose weight;Improve Rate Your Plate Survey Score      Goals Met  Nutrition Goals Met: Completed Nutritional Risk Screen;Provided Rate your Plate Survey;Reviewed Dietitian schedule;Patient states following a low saturated fat diet      Height, Weight, and  BMI  Weight: 231 lb  "12.8 oz (105.1 kg)  Height: 5' 8\" (1.727 m)  BMI: 35.25      Nutrition Follow-up  Follow-up/Discharge: Pt has made great changes in his diet. Pt has lost 10lbs since surgery and hopes to lose 15 more lbs. Pt has quit eating potato chips and has been watching his portions. Pt also has cut down on his eating out and has not had fast food since surgery. Pt and wife do the cooking. Pt is eating more chicken and less red meat. Reviewed low sodium diet especially with his htn and reviewed reading labels keeping sodium under 2000mg. Pt is also watching carbs to help with weight loss. Pt is compliant with statin medication and is weighing himself daily. Pt does not drink alcohol and is increasing his water intake.          Other Risk Factors  Other Risk Factor Assessment: Initial      HTN Risk Factor: Hypertension      Pre Exercise BP: 148/78  Post Exercise BP: 148/78      Hypertension Plan  Goals  HTN Goals: Follow low sodium diet      Goals Met  HTN Goals Met: Take medication as prescribed;Exercises regularly      HTN Interventions  HTN Interventions: Therapist/patient discussion;Provide written material;Offer educational classes      HTN Education Completed  HTN Education Completed: Low sodium diet;Medication review;Risk factor overview      Tobacco Risk Factor: NA      Risk Factor Follow-up   Follow-up/Discharge: Pt compliant with losartan and metoprolol but pt continues to have HTN- notified care team about bp's today and any recommendations and will continue to monitor. Pt monitors bp at home 3x/day.      PSYCHOSOCIAL  Psychosocial Assessment: Initial       [facility] Q of L Summary Score: 12      PHQ-9 Total Score: 0      Psychosocial Risk Factor: Stress      Psychosocial Plan  Interventions    Interventions: Offer educational videos and classes;Provide written material;Individual education and counseling       Education Completed  Education Completed: Relaxation/Coping Techniques;S/S of depression;Effects of " stress on body      Goals  Goals (Next 30 days): Improvement in [facility] COOP score;Practicing stress management skills      Goals Met  Goals Met: Identified Support system;Oriented to stress management classes;Identify stressors      Psychosocial Follow-up  Follow-up/Discharge: Pt reports his stress comes from his high blood pressure ( he constantly worries about it ) and also COVID-19. Emotional support given to patient and we did review stress management techniques and pt is exercising for stress management. Pt has a great support system in his wife and pt is optimistic about the future. Pt is very pleased with how his surgery went and how good he does feel. Pt also looking forward to being monitored in cardiac rehab.              Patient involved in Goal setting?: Yes

## 2021-06-08 NOTE — TELEPHONE ENCOUNTER
Pt had questions about his 30 day post TAVR echo. Addressed pts questions about the reported paravalvular leak on his 30 day post-tavr echo. Pt states he is feeling great. Doing well in cardiac rehab and will start playing golf a few times per week. Pt is also interested in riding his bike 10 miles each day to get the newspaper. Pt will monitor how things are going and call the heart clinic if there are any questions or concerns. Patient verbalizes understanding and agrees with plan. No further questions or concerns at this time.

## 2021-06-09 ENCOUNTER — RECORDS - HEALTHEAST (OUTPATIENT)
Dept: ADMINISTRATIVE | Facility: CLINIC | Age: 71
End: 2021-06-09

## 2021-06-11 NOTE — PATIENT INSTRUCTIONS - HE
Chandler Eldridge,    It was a pleasure to see you today at the [facility].     My recommendations after this visit include:    1.  We will plan a lipid panel in the next couple weeks.  You can have that drawn here or at our [place] office.    2.  Please call Marianna Dias RN with blood pressure readings in about 3 weeks.  If you could take it 3 times a week and report 10 blood pressure readings that would be excellent.  Please call her at [phone]            Tico Andre

## 2021-06-12 NOTE — TELEPHONE ENCOUNTER
----- Message -----  From: Tico Andre MD  Sent: 10/12/2020   9:31 AM CDT  To: Marianna Dias, EAN Cabral,    I may have answered this? Can he take 25 mg of toprol each day and call us with some BP's and HR's?    ThanksTico  -------------------------------------------------------------------------------------------------------------    Reviewed response and recommendations with patient-- understanding and agreement verbalized.   Patient will send MyChart update with BPs or with any other concerns.   Med list updated. -ejb

## 2021-06-12 NOTE — TELEPHONE ENCOUNTER
Dr. Andre please see update from patient below. Any new recommendations? -ejb    ----- Message from Letitia Jean CMA sent at 10/6/2020  7:52 AM CDT -----  Regarding: B/P  Good morning,    Pt came in for lipids and was told by J to bring in b/p readings. He reports his average 10 day reading is 143/72 with a pulse of 62. Pt can be reached at [phone] if there are any recommendations.    Thanks,  Grace

## 2021-06-12 NOTE — TELEPHONE ENCOUNTER
----- Message -----  From: Tico Andre MD  Sent: 10/28/2020  10:33 AM CDT  To: Marianna Dias RN  Subject: FW: Question about medications                   Marianna,    BP's better. F/U as planned.    Tico        Response reviewed with patient via phone. -monroeb

## 2021-06-15 NOTE — TELEPHONE ENCOUNTER
RN cannot approve Refill Request    RN can NOT refill this medication PCP messaged that patient is overdue for Office Visit. Last office visit: Visit date not found Last Physical: Visit date not found Last MTM visit: Visit date not found Last visit same specialty: Visit date not found.  Next visit within 3 mo: Visit date not found  Next physical within 3 mo: Visit date not found      Dayanna Gomez, [facility] Triage/Med Refill 3/2/2021    Requested Prescriptions   Pending Prescriptions Disp Refills     atorvastatin (LIPITOR) 80 MG tablet  0     Sig: Take 1 tablet (80 mg total) by mouth daily.       Statins Refill Protocol (Hmg CoA Reductase Inhibitors) Failed - 3/2/2021 12:07 PM        Failed - PCP or prescribing provider visit in past 12 months      Last office visit with prescriber/PCP: Visit date not found OR same dept: Visit date not found OR same specialty: Visit date not found  Last physical: Visit date not found Last MTM visit: Visit date not found   Next visit within 3 mo: Visit date not found  Next physical within 3 mo: Visit date not found  Prescriber OR PCP: Naman Padron MD  Last diagnosis associated with med order: There are no diagnoses linked to this encounter.  If protocol passes may refill for 12 months if within 3 months of last provider visit (or a total of 15 months).

## 2021-06-16 PROBLEM — Z95.2 S/P TAVR (TRANSCATHETER AORTIC VALVE REPLACEMENT): Status: ACTIVE | Noted: 2020-03-17

## 2021-06-16 PROBLEM — I25.10 CORONARY ARTERY DISEASE INVOLVING NATIVE CORONARY ARTERY OF NATIVE HEART WITHOUT ANGINA PECTORIS: Status: ACTIVE | Noted: 2020-01-16

## 2021-06-16 PROBLEM — E66.01 MORBID OBESITY (H): Status: ACTIVE | Noted: 2021-04-05

## 2021-06-16 PROBLEM — I35.0 SEVERE AORTIC STENOSIS: Status: ACTIVE | Noted: 2020-03-11

## 2021-06-17 NOTE — ANESTHESIA CARE TRANSFER NOTE
Last vitals:   Vitals:    05/11/21 1527   BP: 126/61   Pulse: 79   Resp: 16   Temp: 36.1  C (97  F)   SpO2: 100%     Patient's level of consciousness is drowsy  Spontaneous respirations: yes  Maintains airway independently: yes  Dentition unchanged: yes  Oropharynx: oropharynx clear of all foreign objects    QCDR Measures:  ASA# 20 - Surgical Safety Checklist: WHO surgical safety checklist completed prior to induction    PQRS# 430 - Adult PONV Prevention: 4558F - Pt received => 2 anti-emetic agents (different classes) preop & intraop  ASA# 8 - Peds PONV Prevention: NA - Not pediatric patient, not GA or 2 or more risk factors NOT present  PQRS# 424 - Christelle-op Temp Management: 4559F - At least one body temp DOCUMENTED => 35.5C or 95.9F within required timeframe  PQRS# 426 - PACU Transfer Protocol: - Transfer of care checklist used  ASA# 14 - Acute Post-op Pain: ASA14B - Patient did NOT experience pain >= 7 out of 10

## 2021-06-17 NOTE — TELEPHONE ENCOUNTER
----- Message from KAILASH Mckeon sent at 5/6/2021 10:28 AM CDT -----  Regarding: ASA HOLD  General phone call:    Caller: CHHAYA JARAMILLO NP @     Primary cardiologist: MA    Detailed reason for call: NEEDING A 5-7 DAY HOLD ASA, IF OK, PATIENT SCHEDULED FOR HIP SURG ON 5/11.    Best phone number: [phone]    Best time to contact: ANY    Ok to leave a detailed message? YES, SECURE LINE.    Device? NO    Additional Info:

## 2021-06-17 NOTE — ANESTHESIA PREPROCEDURE EVALUATION
Anesthesia Evaluation      History of anesthetic complications     Airway   Mallampati: II  Neck ROM: full   Pulmonary - normal exam   (+) shortness of breath,                          Cardiovascular   Exercise tolerance: > or = 4 METS  (+) hypertension, valvular problems/murmurs AS, CAD, ,     ECG reviewed  Rhythm: regular  Rate: normal,      ROS comment: Summary      Left ventricle ejection fraction is normal. The calculated left ventricular ejection fraction is 65% without wall motion abnormality.    Normal right ventricular size and systolic function.    Normal function of a bioprosthetic aortic valve with mean gradient of 12 mmHg. There is mild aortic insufficiency which appears to be paravalvular.    When compared to the previous study dated 5/4/2020, mild aortic insufficiency now identified.       S/p TAVR     Neuro/Psych - negative ROS     Comments: Previous back surgery L2-S1. Per pt    Endo/Other - negative ROS      GI/Hepatic/Renal    (+) GERD,             Dental - normal exam                        Anesthesia Plan  Planned anesthetic: spinal  Discussed rare risks of bleeding, infection, nerve damage, failure and LAST. We discussed risks of headache, failure and low blood pressure. Pt wishes to proceed  Will attempt SAB but discussed possibility of converting to GA if previous spine surgery complicates SAB  ASA 3     Anesthetic plan and risks discussed with: patient and spouse    Post-op plan: routine recovery

## 2021-06-17 NOTE — ANESTHESIA PROCEDURE NOTES
Spinal Block    Patient location during procedure: OR  Start time: 5/11/2021 1:53 PM  End time: 5/11/2021 1:58 PM  Reason for block: primary anesthetic    Staffing:  Performing  Anesthesiologist: Carroll Beard MD    Preanesthetic Checklist  Completed: patient identified, risks, benefits, and alternatives discussed, timeout performed, consent obtained, airway assessed, oxygen available, suction available, emergency drugs available and hand hygiene performed  Spinal Block  Patient position: sitting  Prep: ChloraPrep  Patient monitoring: heart rate, cardiac monitor, continuous pulse ox and blood pressure  Approach: midline  Location: L3-4  Injection technique: single-shot  Needle type: pencil-tip   Needle gauge: 24 G      Additional Notes:  One pass. EZ

## 2021-06-17 NOTE — TELEPHONE ENCOUNTER
He inbox messaged me yesterday and I responded to that message.     Leticia Miller PA-C, MPAS  Structural Heart Program  [facility]

## 2021-06-17 NOTE — ANESTHESIA POSTPROCEDURE EVALUATION
Patient: Chandler Eldridge  Procedure(s):  LEFT TOTAL HIP ARTHROPLASTY (Left)  Anesthesia type: spinal    Patient location: PACU  Last vitals:   Vitals Value Taken Time   /66 05/11/21 1600   Temp 36.3  C (97.3  F) 05/11/21 1550   Pulse 73 05/11/21 1605   Resp 26 05/11/21 1602   SpO2 97 % 05/11/21 1605   Vitals shown include unvalidated device data.  Post vital signs: stable  Level of consciousness: awake and responds to simple questions  Post-anesthesia pain: pain controlled  Post-anesthesia nausea and vomiting: no  Pulmonary: unassisted, return to baseline  Cardiovascular: stable and blood pressure at baseline  Hydration: adequate  Anesthetic events: no    QCDR Measures:  ASA# 11 - Christelle-op Cardiac Arrest: ASA11B - Patient did NOT experience unanticipated cardiac arrest  ASA# 12 - Christelle-op Mortality Rate: ASA12B - Patient did NOT die  ASA# 13 - PACU Re-Intubation Rate: NA - No ETT / LMA used for case  ASA# 10 - Composite Anes Safety: ASA10A - No serious adverse event    Additional Notes:

## 2021-06-18 NOTE — PATIENT INSTRUCTIONS - HE
Patient Instructions by Leticia Miller PA-C at 4/5/2021  1:30 PM     Author: Leticia Miller PA-C Service: -- Author Type: Physician Assistant    Filed: 4/5/2021  2:45 PM Encounter Date: 4/5/2021 Status: Signed    : Leticia Miller PA-C (Physician Assistant)       Chandler Eldridge,    It was a pleasure to see you today in the clinic regarding your 1 year follow up after TAVR.     My recommendations after this visit include:     - no changes    You should followup with Dr. Andre in September      If you have questions or concerns, please call using the numbers below:    Valve Clinic Pool  [phone]    After Hours/Scheduling  [phone]    Otherwise you can dial the nurse directly at:    Viki Carranza RN  Valve clinic coordinator  [phone]

## 2021-06-18 NOTE — PATIENT INSTRUCTIONS - HE
Patient Instructions by Leticia Miller PA-C at 3/25/2020 12:50 PM     Author: Leticia Miller PA-C Service: -- Author Type: Physician Assistant    Filed: 3/25/2020  1:09 PM Encounter Date: 3/25/2020 Status: Signed    : Leticia Miller PA-C (Physician Assistant)       Chandler Eldridge,    It was a pleasure to see you today at the [facility].     My recommendations after this visit include:    - start toprol 12.5 mg daily  - okay to drive and bike    You should followup with Dr. Wolfe on May 6 (with echo on May 4)      If you have questions or concerns, please call using the numbers below:    Valve Clinic Pool  [phone]    After Hours/Scheduling  [phone]    Otherwise you can dial the nurse directly at:    Viki Carranza RN  Valve clinic coordinator  [phone]

## 2021-06-20 NOTE — LETTER
Letter by Linden Grover MD at      Author: Linden Grover MD Service: -- Author Type: --    Filed:  Encounter Date: 3/9/2020 Status: (Other)         Naman Padron MD  [address]  [place] [address]                                  March 9, 2020    Patient: Chandler Eldridge   MR Number: [id number]   YOB: 1950   Date of Visit: 3/9/2020     Dear Dr. Nereida MD:    Thank you for referring Chandler Eldridge to me for evaluation. Below are the relevant portions of my assessment and plan of care.    If you have questions, please do not hesitate to call me. I look forward to following Chandler along with you.    Sincerely,        Linden Grover MD            MD Jamar Tracy MD Knoper, Ryan Christopher, MD  3/9/2020  4:15 PM  Sign when Signing Visit        Cardiothoracic Surgery Consult    Date of Service: 3/9/2020    REFERRING CARDIOLOGIST: Krzysztof Wolfe MD    REASON FOR CONSULTATION: Severe, symptomatic aortic valve stenosis     HISTORY OF PRESENT ILLNESS: Mr. Chandler Eldridge is a 70 y.o. year-old male who presents with severe, symptomatic aortic valve stenosis. His symptoms mostly include easy fatigability and shortness of breath but he does have chest pain on occasion. He denies syncope or presyncope. He has noted a progression of his symptoms in the last several weeks but continues to remain active.   .   PAST MEDICAL HISTORY:   Past Medical History:   Diagnosis Date   ? Aortic stenosis    ? Coronary artery disease    ? Family history of myocardial infarction     mom dad   ? GERD (gastroesophageal reflux disease)    ? Hyperlipidemia    ? Hypertension    ? SOB (shortness of breath)        PAST SURGICAL HISTORY:   Past Surgical History:   Procedure Laterality Date   ? CARDIAC CATHETERIZATION     ? CV CORONARY ANGIOGRAM N/A 1/22/2020    Procedure: Coronary Angiogram;  Surgeon: Jamar Almanza MD;  Location: [facility] Cath Lab;  Service:  Cardiology   ? CV LEFT HEART CATHETERIZATION WO LEFT VETRICULOGRAM Left 1/22/2020    Procedure: Left Heart Catheterization Without Left Ventriculogram;  Surgeon: Jamar Almanza MD;  Location: [facility] Cath Lab;  Service: Cardiology   ? CV RIGHT HEART CATH N/A 1/22/2020    Procedure: Right Heart Catheterization;  Surgeon: Jamar Almanza MD;  Location: [facility] Cath Lab;  Service: Cardiology   ? FOOT SURGERY Bilateral    ? HERNIA REPAIR  2002    BIHR done by Dr. Schmidt   ? LUMBAR SPINE SURGERY  2004   ? SPINAL FUSION  2009   ? SPINAL FUSION Bilateral 2009   ? TOTAL SHOULDER REPLACEMENT Left 11/2018       ALLERGIES:   Allergies   Allergen Reactions   ? Penicillins Hives          CURRENT MEDICATIONS:  Current Outpatient Medications on File Prior to Visit   Medication Sig Dispense Refill   ? aspirin 81 MG EC tablet Take 81 mg by mouth daily.     ? atorvastatin (LIPITOR) 80 MG tablet Take 80 mg by mouth daily.     ? losartan (COZAAR) 100 MG tablet Take 100 mg by mouth at bedtime.     ? metoprolol succinate (TOPROL-XL) 25 MG Take 1 tablet (25 mg total) by mouth daily. 30 tablet 12     No current facility-administered medications on file prior to visit.          FAMILY HISTORY:   Family History   Problem Relation Age of Onset   ? Coronary artery disease Mother    ? Coronary artery disease Father      The family history was reviewed and is not pertinent to the patient's disease/illness    SOCIAL HISTORY:   Social History     Socioeconomic History   ? Marital status:      Spouse name: Not on file   ? Number of children: Not on file   ? Years of education: Not on file   ? Highest education level: Not on file   Occupational History   ? Not on file   Social Needs   ? Financial resource strain: Not on file   ? Food insecurity     Worry: Not on file     Inability: Not on file   ? Transportation needs     Medical: Not on file     Non-medical: Not on file   Tobacco Use   ? Smoking status: Never Smoker   ?  Smokeless tobacco: Never Used   Substance and Sexual Activity   ? Alcohol use: Yes     Comment: rare   ? Drug use: No   ? Sexual activity: Not on file   Lifestyle   ? Physical activity     Days per week: Not on file     Minutes per session: Not on file   ? Stress: Not on file   Relationships   ? Social connections     Talks on phone: Not on file     Gets together: Not on file     Attends [employer] service: Not on file     Active member of club or organization: Not on file     Attends meetings of clubs or organizations: Not on file     Relationship status: Not on file   ? Intimate partner violence     Fear of current or ex partner: Not on file     Emotionally abused: Not on file     Physically abused: Not on file     Forced sexual activity: Not on file   Other Topics Concern   ? Not on file   Social History Narrative   ? Not on file       REVIEW OF SYSTEMS:  A complete 10 point review of systems was obtained and is negative other than the above stated complaints    PHYSICAL EXAMINATION:   Vitals:   Vitals:    03/09/20 1352   BP: 180/82   Pulse: (!) 56   Resp: 16     GENERAL: Well developed and well nourished   HEENT: EOMI, and conjunctiva anicteric and sclera clear   NECK: No JVD, trachea midline  CARDIOVASCULAR: sinus bradycardia, systolic murmur heard best at upper sternal border  RESPIRATIONS: no increased work of breathing  ABDOMEN: soft, NT, ND  EXTREMITIES: no deformity or swelling   NEUROLOGIC: intact and symmetric with no focal deficits.   PSYCHIATRIC: alert and oriented x3, pleasant       LABORATORY STUDIES:   Lab Results   Component Value Date    WBC 7.5 02/27/2020    HGB 14.8 02/27/2020    HCT 43.3 02/27/2020    MCV 89 02/27/2020     02/27/2020      Lab Results   Component Value Date    CREATININE 1.18 02/27/2020    BUN 16 02/27/2020     02/27/2020    K 4.3 02/27/2020     02/27/2020    CO2 28 02/27/2020     No results found for: HGBA1C  EKG: NSR rate 63.    CARDIAC CATHETERIZATION: This  study was personally reviewed by me  Findings:  LM:no obstruction  LAD:20-30% proximal/mid-vessel irregularity  Lcx:mildly irregular  RCA:dominant, mid-vessel 60% narrowing, similar to prior angio     LVEDP:11     RHC:  RA:3   RV:23/5  PA:23/16 (19)  PCWP:16     AV gradient simultaneous: 72/53  AV gradient mean, pullback: 43     SvO2:72  Ao sat: 95%     CO/CI:  Marta:5.6/2.6    TRANSTHORACIC ECHOCARDIOGRAM: This study was personally reviewed by me    1.Left ventricle ejection fraction is normal. The calculated left ventricular ejection fraction is 68%.    2.TAPSE is normal, which is consistent with normal right ventricular systolic function.    3.The aortic root is mildly dilated.    4.There is moderate global thickening of the aortic valve. Moderate to severe aortic stenosis. Mild aortic regurgitation. Mean gradient 36 mmHg with mean velocity 2.8 m/s. Visually valve appears more stenotic than hemodynamics suggest.    5.When compared to the previous study dated 10/3/2017, the aortic stenosis is worsened, prior mean gradient was 19 mmHg, valve today appears less mobile.    IMPRESSION AND PLAN: Mr. Chandler Eldridge is a 70 y.o. Year-old male  with severe aortic stenosis. Echocardiography demonstrates preserved function with an LVEF of 68% , and coronary angiography demonstrates moderate RCA disease and mild LAD disease.  I recommend aortic valve replacement. I discussed the technical details of the open surgical AVR with the patient, as well as the expected postoperative course and recovery. The risks include but are not limited to bleeding, infection, stroke, heart or graft failure, dysrhythmia, respiratory failure, kidney or liver injury, bowel or limb ischemia, and death. His calculated STS risk for mortality with isolated aortic valve replacement is 1%. He is a good candidate for both SAVR and TAVR but desires short recovery and I therefore agree he is a good candidate for transcatheter aortic valve replacement. I  discussed the technical details of this with him as well. He understands that there is a risk of bleeding, infection, stroke, heart block requiring permanent pacemaker, cardiac perforation, aortic root rupture and aortic dissection, in addition to risk of death. He wishes to proceed with scheduling for TAVR.      Thank you very much for this referral.       Linden Grover 3/9/2020 7:49 AM

## 2021-06-25 NOTE — PROGRESS NOTES
Progress Notes by Tico Andre MD at 9/27/2017  7:50 AM     Author: Tico Andre MD Service: -- Author Type: Physician    Filed: 9/27/2017  8:04 AM Encounter Date: 9/27/2017 Status: Signed    : Tico Andre MD (Physician)           Click to link to [facility] HEART CARE NOTE    Thank you, Dr. Padron, for asking us to see Chandler Eldridge at the [facility].      Assessment/Recommendations   Patient with known mild to moderate coronary artery disease as well as mild aortic stenosis by echocardiogram in December 2016.  He is doing well from a functional capacity standpoint but did have an episode where he is both short of breath and then lightheaded.  This was likely related to the heat but I think would be reasonable to check a stress echocardiogram given his known heart disease.  We will do that in the near future.  If the stress echocardiogram was unremarkable we will see him in follow-up in about 1 year and plan an echocardiogram prior to that.    Thank you for allowing us to participate in his care.         History of Present Illness    Mr. Chandler Eldridge is a 67 y.o. male with known history of mild aortic stenosis as well as mild to moderate coronary artery disease.  He has been feeling well and since last Thanksgiving is lost over 30 pounds.  He golfs 3 times a week and generally walks when he golfs and days he does not golf he rides his bike 10-12 miles.  He takes one day off each week.  He had an episode last week where he was playing golf and it was quite warm outside.  In the back night he started to get short of breath with activity and a couple of times had to stop to catch his breath and even sat down on the course.  On the 18th hole he lost side of his ball and felt like he was a little bit disoriented.  He put his hands on his card and walked back  toward his car but felt short of breath.  He also felt lightheadedness if he would pass  out.  He felt like he was keeping himself hydrated on the course but drank a bunch of fluid thereafter and sat in the car with the air conditioner blasting on him.  It took about 15 or 20 minutes before he felt better.  He considered calling 911.  He did not have chest discomfort.    He denies orthopnea, paroxysmal nocturnal dyspnea, peripheral edema, prior syncopal or near syncopal episodes.  He has not had any chest discomfort or chest tightness.  LDL cholesterol was recently 58.  His blood pressures at home are in the 130s.       Physical Examination Review of Systems   Vitals:    09/27/17 0737   BP: 148/78   Pulse: 60   Resp: 18     Body mass index is 31.01 kg/(m^2).  Wt Readings from Last 3 Encounters:   09/27/17 210 lb (95.3 kg)   05/25/16 (!) 236 lb (107 kg)   04/22/16 (!) 234 lb (106.1 kg)     General Appearance:   Alert, cooperative and in no acute distress.   ENT/Mouth: Oral mucuos membranes pink and moist .      EYES:  No scleral icterus. No Xanthelasma.    Neck: JVP normal. No Hepatojugular reflux. Thyroid not visualized   Chest/Lungs:   Lungs are clear to auscultation, equal chest wall expansion    Cardiovascular:   S1, S2 without murmur ,clicks or rubs. Brachial, radial and posterior tibial pulses are intact and symetric. No carotid bruits noted   Abdomen:  Nontender. BS+.       Extremities: No cyanosis, clubbing or edema   Skin: no xanthelasma, warm.    Psych: Appropriate affect.   Neurologic: normal gait, normal  bilateral, no tremors        General: Weight Loss  Eyes: WNL  Ears/Nose/Throat: WNL  Lungs: WNL  Heart: WNL  Stomach: WNL  Bladder: WNL  Muscle/Joints: WNL  Skin: WNL  Nervous System: WNL  Mental Health: WNL     Blood: WNL     Medical History  Surgical History Family History Social History   Past Medical History:   Diagnosis Date   ? Hyperlipidemia    ? Hypertension     Past Surgical History:   Procedure Laterality Date   ? HERNIA REPAIR  2002    BIHR done by Dr. Schmidt   ? LUMBAR  SPINE SURGERY  2004   ? SPINAL FUSION  2009   ? SPINAL FUSION Bilateral 2009    No family history on file. Social History     Social History   ? Marital status:      Spouse name: N/A   ? Number of children: N/A   ? Years of education: N/A     Occupational History   ? Not on file.     Social History Main Topics   ? Smoking status: Never Smoker   ? Smokeless tobacco: Not on file   ? Alcohol use Yes      Comment: rare   ? Drug use: No   ? Sexual activity: Not on file     Other Topics Concern   ? Not on file     Social History Narrative          Medications  Allergies   Current Outpatient Prescriptions   Medication Sig Dispense Refill   ? aspirin 81 MG EC tablet Take 81 mg by mouth daily.     ? atorvastatin (LIPITOR) 80 MG tablet Take 80 mg by mouth daily.     ? losartan (COZAAR) 50 MG tablet Take 100 mg by mouth daily.      ? nitroglycerin (NITROSTAT) 0.4 MG SL tablet Place 0.4 mg under the tongue as needed for chest pain.       No current facility-administered medications for this visit.       Allergies   Allergen Reactions   ? Penicillins Hives         Lab Results    Chemistry/lipid CBC Cardiac Enzymes/BNP/TSH/INR   Lab Results   Component Value Date    CHOL 121 04/11/2017    HDL 46 04/11/2017    LDLCALC 58 04/11/2017    TRIG 84 04/11/2017    CREATININE 1.12 04/11/2017    BUN 16 04/11/2017    K 4.9 04/11/2017     04/11/2017     04/11/2017    CO2 27 04/11/2017    Lab Results   Component Value Date    WBC 9.4 06/21/2011    HGB 14.1 06/21/2011    HCT 40.9 06/21/2011    MCV 90 06/21/2011     06/21/2011    Lab Results   Component Value Date    TROPONINI <0.01 06/22/2011    BNP 20 06/21/2011    INR 1.03 06/21/2011

## 2021-06-28 NOTE — PROGRESS NOTES
Progress Notes by Tico Andre MD at 1/16/2020  8:50 AM     Author: Tico Andre MD Service: -- Author Type: Physician    Filed: 1/16/2020  9:37 AM Encounter Date: 1/16/2020 Status: Signed    : Tico Andre MD (Physician)               Assessment/Recommendations   Patient with known mild to moderate coronary artery disease many years ago by diagnostic angiography.  He has more shortness of breath and his echocardiogram shows that his aortic stenosis is progressed.  He is gone from a mean gradient of 19mmHg to 36 mmHg in 2 years.  His functional capacity is also changed and I am concerned that the aortic valve and the aortic stenosis may be contributing to his shortness of breath.    I have asked him to be seen in our valve clinic.  He may be a candidate for various types of procedures and a course he would favor a less invasive.  If he cannot get into the valve clinic within the next few weeks, we will schedule a right and left heart catheterization as he may have worsening coronary artery disease as well.  The heart catheterization will be needed for the evaluation for his aortic valve treatments as well.    Thank you for allowing us to participate in his care.       History of Present Illness/Subjective    Mr. Chandler Eldridge is a 69 y.o. male with known mild to moderate coronary artery disease by diagnostic angiography several years ago.  He also has aortic stenosis and a recent echocardiogram showed a mean gradient of 36 mmHg which is increased from 19 mmHg 2 years ago.  He does note more shortness of breath with activity.  If he mows the lawn he has to now stop 2 or 3 times whereas a year ago, or the summer before he did not.  He notices shortness of breath when he is doing other physical activities although he works out and has only mild reduction in his functional capacity when he is exercising at the gym.  He has not had chest discomfort except an atypical discomfort in his left chest  which is unpredictable, does not come on with physical activity, and lasts a minute or 2 and then goes away on its own.  He has not had orthopnea or paroxysmal nocturnal dyspnea.  He denies peripheral edema, syncope or near syncopal episodes.  When he bends over and comes up quickly sometimes he feels a little lightheaded           Physical Examination Review of Systems   Vitals:    01/16/20 0851   BP: 144/80   Pulse: 71   Resp: 14     Body mass index is 34.7 kg/m .  Wt Readings from Last 3 Encounters:   01/16/20 (!) 235 lb (106.6 kg)   12/20/19 210 lb (95.3 kg)   09/27/17 210 lb (95.3 kg)     General Appearance:   Alert, cooperative and in no acute distress.   ENT/Mouth: Oral mucuos membranes pink and moist .      EYES:  no scleral icterus, normal conjunctivae   Neck: JVP normal. No Hepatojugular reflux. Thyroid not visualized.   Chest/Lungs:   Lungs are clear to auscultation, equal chest wall expansion.   Cardiovascular:   S1, S2 with 3/6 systolic murmur , no clicks or rubs. Brachial, radial and posterior tibial pulses are intact and symetric.  Murmur transmitted to the right carotid   Abdomen:  Nontender. BS+.    Extremities: No cyanosis, clubbing or edema   Skin: no xanthelasma, warm.    Neurologic: normal  bilateral, no tremors     Psychiatric: Appropriate affect.      General: WNL  Eyes: WNL  Ears/Nose/Throat: WNL  Lungs: Shortness of Breath  Heart: WNL  Stomach: WNL  Bladder: WNL  Muscle/Joints: WNL  Skin: WNL  Nervous System: WNL  Mental Health: WNL     Blood: WNL       Medical History  Surgical History Family History Social History   Past Medical History:   Diagnosis Date   ? Coronary artery disease    ? Family history of myocardial infarction     mom dad   ? GERD (gastroesophageal reflux disease)    ? Hyperlipidemia    ? Hypertension     Past Surgical History:   Procedure Laterality Date   ? CARDIAC CATHETERIZATION     ? HERNIA REPAIR  2002    BIHR done by Dr. Schmidt   ? LUMBAR SPINE SURGERY  2004    ? SPINAL FUSION  2009   ? SPINAL FUSION Bilateral 2009    Family History   Problem Relation Age of Onset   ? Coronary artery disease Mother    ? Coronary artery disease Father     Social History     Socioeconomic History   ? Marital status:      Spouse name: Not on file   ? Number of children: Not on file   ? Years of education: Not on file   ? Highest education level: Not on file   Occupational History   ? Not on file   Social Needs   ? Financial resource strain: Not on file   ? Food insecurity:     Worry: Not on file     Inability: Not on file   ? Transportation needs:     Medical: Not on file     Non-medical: Not on file   Tobacco Use   ? Smoking status: Never Smoker   ? Smokeless tobacco: Never Used   Substance and Sexual Activity   ? Alcohol use: Yes     Comment: rare   ? Drug use: No   ? Sexual activity: Not on file   Lifestyle   ? Physical activity:     Days per week: Not on file     Minutes per session: Not on file   ? Stress: Not on file   Relationships   ? Social connections:     Talks on phone: Not on file     Gets together: Not on file     Attends [employer] service: Not on file     Active member of club or organization: Not on file     Attends meetings of clubs or organizations: Not on file     Relationship status: Not on file   ? Intimate partner violence:     Fear of current or ex partner: Not on file     Emotionally abused: Not on file     Physically abused: Not on file     Forced sexual activity: Not on file   Other Topics Concern   ? Not on file   Social History Narrative   ? Not on file          Medications  Allergies   Current Outpatient Medications   Medication Sig Dispense Refill   ? aspirin 81 MG EC tablet Take 81 mg by mouth daily.     ? atorvastatin (LIPITOR) 80 MG tablet Take 80 mg by mouth daily.     ? losartan (COZAAR) 50 MG tablet Take 100 mg by mouth daily.      ? nitroglycerin (NITROSTAT) 0.4 MG SL tablet Place 0.4 mg under the tongue as needed for chest pain.       No current  facility-administered medications for this visit.     Allergies   Allergen Reactions   ? Penicillins Hives         Lab Results    Chemistry/lipid CBC Cardiac Enzymes/BNP/TSH/INR   Lab Results   Component Value Date    CHOL 159 11/20/2019    HDL 43 11/20/2019    LDLCALC 88 11/20/2019    TRIG 140 11/20/2019    CREATININE 1.17 11/20/2019    BUN 16 11/20/2019    K 4.5 11/20/2019     11/20/2019     11/20/2019    CO2 25 11/20/2019    Lab Results   Component Value Date    WBC 9.4 06/21/2011    HGB 14.1 06/21/2011    HCT 40.9 06/21/2011    MCV 90 06/21/2011     06/21/2011    Lab Results   Component Value Date    TROPONINI <0.01 06/22/2011    BNP 20 06/21/2011    INR 1.03 06/21/2011

## 2021-06-28 NOTE — PROGRESS NOTES
"Progress Notes by Leticia Miller PA-C at 3/25/2020 12:50 PM     Author: Leticia Miller PA-C Service: -- Author Type: Physician Assistant    Filed: 3/25/2020  1:56 PM Encounter Date: 3/25/2020 Status: Signed    : Leticia Miller PA-C (Physician Assistant)       The patient has been notified of following:     \"This telephone visit will be conducted via a call between you and your physician/provider. We have found that certain health care needs can be provided without the need for a physical exam.  This service lets us provide the care you need with a phone conversation.  If a prescription is necessary we can send it directly to your pharmacy.  If lab work is needed we can place an order for that and you can then stop by our lab to have the test done at a later time. If during the course of the call the physician/provider feels a telephone visit is not appropriate, you will not be charged for this service.\" Verbal consent has been obtained for this service by care team member:         HEART CARE PHONE ENCOUNTER        The patient has chosen to have the visit conducted as a telephone visit, to reduce risk of exposure given the current status of Coronavirus in our community. This telephone visit is being conducted via a call between the patient and physician/provider. Health care needs are being provided without a physical exam.     Assessment/Recommendations   Assessment:    1.  Severe aortic stenosis - s/p TAVR on March 17, via the right transfemoral approach and using a #29 Maverick 3 valve.  Patient is seeing improvement in his functional status and has more energy, less fatigue and less shortness of breath.  He has been walking twice daily outside with his wife or on treadmill.  Groin sites are healing nicely although left groin still with lots of bruising.  He is scheduled to start cardiac rehab April 1, but this may be extended due to COVID-19 restrictions.   He should continue " aspirin 81 mg daily indefinitely    2.  New left bundle branch block -originally resolved after TAVR then recurred on POD #2.  He tolerated beta-blocker originally, then was held, but with HRs back in the mid-70s would resume at low dose of 12.5 mg daily    3. CAD - mild to moderate disease.  He reports no angina.  Patient will continue aspirin, statin and beta-blocker therapy.    Follow Up Plan: Follow up with Dr. Wolfe on May 6, at which time we will get EKG and labs.  Patient will have his 1 month echocardiogram on May 4  I have reviewed the note as documented.  This accurately captures the substance of my conversation with the patient.    Total time of call between patient and provider was 18 minutes   Start Time: 12:25 pm   Stop Time: 12:43 pm       History of Present Illness/Subjective    Chandler Eldridge is a 70 y.o. male who is being evaluated via a billable telephone visit.      Ren had history of aortic stenosis and has been followed by serial echocardiograms.  His most recent echocardiogram showed progression of his aortic stenosis to the severe range and he was having increasing symptoms of dyspnea on exertion and decreased functional capacity.  He underwent transcatheter aortic valve replacement on March 17.  Postoperatively he had new left bundle branch block, which resolved POD #1, but reappeared after he was given extra dose of beta-blocker.  Beta-blocker has now been held for 1 week and HRs back into mid-70s.  His blood pressure readings have been high 130s to low 140s.  He had intermittent low-grade fevers prior to leaving the hospital, but since being home his temperature has been below 98 consistently.  Cough is gone and he has been walking twice daily either outside with his wife or on the treadmill.  He states that he used to have to mouth breathe, but can now do his walks breathing through his nose.  He notices that he is less fatigued and now only takes 1 nap a day where as he was taking 3  prior to the procedure.  He overall has more energy and feels much better.  He has lost about 7 pounds, but is eating normally.    I have reviewed and updated the patient's Past Medical History, Social History, Family History and Medication List.    Predischarge echocardiogram (March 18):    Aortic Valve: There is a bioprosthetic valve present. There is no aortic insufficiency present. The prosthetic valve is normal.    Left ventricle ejection fraction is normal. The calculated left ventricular ejection fraction is 63%.    Normal right ventricular size and systolic function.    No pericardial effusion.  MG 11 mmHg     Physical Examination not performed given phone encounter Review of Systems                                                Medical History  Surgical History Family History Social History   Past Medical History:   Diagnosis Date   ? Aortic stenosis    ? Coronary artery disease    ? Family history of myocardial infarction     mom dad   ? GERD (gastroesophageal reflux disease)    ? Hyperlipidemia    ? Hypertension    ? SOB (shortness of breath)     Past Surgical History:   Procedure Laterality Date   ? CARDIAC CATHETERIZATION     ? CV CORONARY ANGIOGRAM N/A 1/22/2020    Procedure: Coronary Angiogram;  Surgeon: Jamar Almanza MD;  Location: [facility] Cath Lab;  Service: Cardiology   ? CV LEFT HEART CATHETERIZATION WO LEFT VETRICULOGRAM Left 1/22/2020    Procedure: Left Heart Catheterization Without Left Ventriculogram;  Surgeon: Jamar Almanza MD;  Location: [facility] Cath Lab;  Service: Cardiology   ? CV RIGHT HEART CATH N/A 1/22/2020    Procedure: Right Heart Catheterization;  Surgeon: Jamar Almanza MD;  Location: [facility] Cath Lab;  Service: Cardiology   ? CV TRANSFEMORAL TRANSCATHETER VALVE REPLACEMENT N/A 3/17/2020    Procedure: Transfemoral Transcatheter Aortic Valve Replacement;  Surgeon: Jamar Almanza MD;  Location: [facility] Cath Lab;  Service: Cardiology   ? FOOT SURGERY  Bilateral    ? HERNIA REPAIR  2002    BI done by Dr. Schmidt   ? LUMBAR SPINE SURGERY  2004   ? SPINAL FUSION  2009   ? SPINAL FUSION Bilateral 2009   ? TOTAL SHOULDER REPLACEMENT Left 11/2018    Family History   Problem Relation Age of Onset   ? Coronary artery disease Mother    ? Coronary artery disease Father     Social History     Socioeconomic History   ? Marital status:      Spouse name: Not on file   ? Number of children: Not on file   ? Years of education: Not on file   ? Highest education level: Not on file   Occupational History   ? Not on file   Social Needs   ? Financial resource strain: Not on file   ? Food insecurity     Worry: Not on file     Inability: Not on file   ? Transportation needs     Medical: Not on file     Non-medical: Not on file   Tobacco Use   ? Smoking status: Never Smoker   ? Smokeless tobacco: Never Used   Substance and Sexual Activity   ? Alcohol use: Yes     Comment: rare   ? Drug use: No   ? Sexual activity: Not on file   Lifestyle   ? Physical activity     Days per week: Not on file     Minutes per session: Not on file   ? Stress: Not on file   Relationships   ? Social connections     Talks on phone: Not on file     Gets together: Not on file     Attends [employer] service: Not on file     Active member of club or organization: Not on file     Attends meetings of clubs or organizations: Not on file     Relationship status: Not on file   ? Intimate partner violence     Fear of current or ex partner: Not on file     Emotionally abused: Not on file     Physically abused: Not on file     Forced sexual activity: Not on file   Other Topics Concern   ? Not on file   Social History Narrative   ? Not on file          Medications  Allergies   Current Outpatient Medications   Medication Sig Dispense Refill   ? aspirin 81 MG EC tablet Take 81 mg by mouth daily.     ? atorvastatin (LIPITOR) 80 MG tablet Take 80 mg by mouth daily.     ? losartan (COZAAR) 100 MG tablet Take 100 mg by  mouth at bedtime.     ? metoprolol succinate (TOPROL-XL) 25 MG Take 0.5 tablets (12.5 mg total) by mouth daily.  0     No current facility-administered medications for this visit.     Allergies   Allergen Reactions   ? Penicillins Hives   ? Vancomycin Hives         Lab Results    Chemistry/lipid CBC Cardiac Enzymes/BNP/TSH/INR   Lab Results   Component Value Date    CHOL 159 11/20/2019    HDL 43 11/20/2019    LDLCALC 88 11/20/2019    TRIG 140 11/20/2019    CREATININE 1.08 03/19/2020    BUN 22 03/19/2020    K 4.1 03/19/2020     03/19/2020     03/19/2020    CO2 23 03/19/2020    Lab Results   Component Value Date    WBC 8.3 03/19/2020    HGB 13.4 (L) 03/19/2020    HCT 41.1 03/19/2020    MCV 90 03/19/2020     (L) 03/19/2020    Lab Results   Component Value Date    TROPONINI <0.01 06/22/2011    BNP 20 06/21/2011    INR 1.17 (H) 03/17/2020

## 2021-06-29 NOTE — PROGRESS NOTES
Progress Notes by Tico Andre MD at 9/24/2020  7:50 AM     Author: Tico Andre MD Service: -- Author Type: Physician    Filed: 9/24/2020  8:21 AM Encounter Date: 9/24/2020 Status: Signed    : Tico Andre MD (Physician)             Assessment/Recommendations   Patient with known mild to moderate coronary artery disease, severe aortic stenosis status post transcutaneous aortic valve replacement.  He is doing very well from a functional capacity standpoint and he is amazed at how much improvement he has in his functional capacity.  He has been very active on a daily basis which I commended him for and encouraged him to continue.    He has been having a high salt diet and his blood pressure is a bit a bit high.  He is cutting back on his salt and we will ask him to report some blood pressures over the course of the next 3 to 4 weeks.  If his blood pressures remain high we will need to bolster is antihypertensive regimen.    It is been sometime since he has had a lipid panel so we will ask him to get a lipid panel either at his primary care clinic or our clinic, whichever he prefers.    Thank you for allowing us to participate in his care       History of Present Illness/Subjective    Mr. Chandler Eldridge is a 70 y.o. male with known mild to moderate coronary disease with a moderate lesion in his right coronary artery.  He also had aortic stenosis which became severe and quite symptomatic.  Transcutaneous aortic valve replacement resulted in market improvement in his functional capacity.  He is just delighted with his ability to walk, play golf while walking, mow the lawn with a push lawnmower and walk on his treadmill for 3 miles an hour.  He gets no chest discomfort, no shortness of breath, and denies orthopnea, paroxysmal nocturnal dyspnea, peripheral edema, syncope or near syncopal episodes.           Physical Examination Review of Systems   Vitals:    09/24/20 0750   BP: 156/76   Pulse: 72    Resp: 16     Body mass index is 35.58 kg/m .  Wt Readings from Last 3 Encounters:   09/24/20 (!) 234 lb (106.1 kg)   05/06/20 (!) 224 lb (101.6 kg)   05/04/20 (!) 229 lb (103.9 kg)     General Appearance:   Alert, cooperative and in no acute distress.   ENT/Mouth: Oral mucuos membranes pink and moist .      EYES:  no scleral icterus, normal conjunctivae   Neck: JVP normal. No Hepatojugular reflux. Thyroid not visualized.   Chest/Lungs:   Lungs are clear to auscultation, equal chest wall expansion.   Cardiovascular:   S1, S2 with 1/6 systolic murmur , no clicks or rubs. Brachial, radial  pulses are intact and symetric. No carotid bruits noted   Abdomen:  Nontender. BS+.  Rounded   Extremities: No cyanosis, clubbing or edema   Skin: no xanthelasma, warm.    Neurologic: normal arm movement bilateral, no tremors     Psychiatric: Appropriate affect.      General: WNL  Eyes: WNL  Ears/Nose/Throat: WNL  Lungs: WNL  Heart: WNL  Stomach: WNL  Bladder: WNL  Muscle/Joints: WNL  Skin: WNL  Nervous System: WNL  Mental Health: WNL     Blood: WNL       Medical History  Surgical History Family History Social History   Past Medical History:   Diagnosis Date   ? Aortic stenosis    ? Coronary artery disease    ? Family history of myocardial infarction     mom dad   ? GERD (gastroesophageal reflux disease)    ? Hyperlipidemia    ? Hypertension    ? SOB (shortness of breath)     Past Surgical History:   Procedure Laterality Date   ? CARDIAC CATHETERIZATION     ? CV CORONARY ANGIOGRAM N/A 1/22/2020    Procedure: Coronary Angiogram;  Surgeon: Jamar Almanza MD;  Location: [facility] Cath Lab;  Service: Cardiology   ? CV LEFT HEART CATHETERIZATION WO LEFT VETRICULOGRAM Left 1/22/2020    Procedure: Left Heart Catheterization Without Left Ventriculogram;  Surgeon: Jamar Almanza MD;  Location: [facility] Cath Lab;  Service: Cardiology   ? CV RIGHT HEART CATH N/A 1/22/2020    Procedure: Right Heart Catheterization;  Surgeon:  Jamar Almanza MD;  Location: [facility] Cath Lab;  Service: Cardiology   ? CV TRANSFEMORAL TRANSCATHETER VALVE REPLACEMENT N/A 3/17/2020    Procedure: Transfemoral Transcatheter Aortic Valve Replacement;  Surgeon: Jamar Almanza MD;  Location: [facility] Cath Lab;  Service: Cardiology   ? FOOT SURGERY Bilateral    ? HERNIA REPAIR  2002    BIHR done by Dr. Schmidt   ? LUMBAR SPINE SURGERY  2004   ? SPINAL FUSION  2009   ? SPINAL FUSION Bilateral 2009   ? TOTAL SHOULDER REPLACEMENT Left 11/2018    Family History   Problem Relation Age of Onset   ? Coronary artery disease Mother    ? Coronary artery disease Father     Social History     Socioeconomic History   ? Marital status:      Spouse name: Not on file   ? Number of children: Not on file   ? Years of education: Not on file   ? Highest education level: Not on file   Occupational History   ? Not on file   Social Needs   ? Financial resource strain: Not on file   ? Food insecurity     Worry: Not on file     Inability: Not on file   ? Transportation needs     Medical: Not on file     Non-medical: Not on file   Tobacco Use   ? Smoking status: Never Smoker   ? Smokeless tobacco: Never Used   Substance and Sexual Activity   ? Alcohol use: Yes     Comment: rare   ? Drug use: No   ? Sexual activity: Not on file   Lifestyle   ? Physical activity     Days per week: Not on file     Minutes per session: Not on file   ? Stress: Not on file   Relationships   ? Social connections     Talks on phone: Not on file     Gets together: Not on file     Attends [employer] service: Not on file     Active member of club or organization: Not on file     Attends meetings of clubs or organizations: Not on file     Relationship status: Not on file   ? Intimate partner violence     Fear of current or ex partner: Not on file     Emotionally abused: Not on file     Physically abused: Not on file     Forced sexual activity: Not on file   Other Topics Concern   ? Not on file    Social History Narrative   ? Not on file          Medications  Allergies   Current Outpatient Medications   Medication Sig Dispense Refill   ? amLODIPine (NORVASC) 10 MG tablet Take 1 tablet (10 mg total) by mouth daily. 30 tablet 11   ? aspirin 81 MG EC tablet Take 81 mg by mouth daily.     ? atorvastatin (LIPITOR) 80 MG tablet Take 80 mg by mouth daily.     ? losartan (COZAAR) 100 MG tablet Take 100 mg by mouth at bedtime.     ? metoprolol succinate (TOPROL-XL) 25 MG Take 0.5 tablets (12.5 mg total) by mouth daily.  0     No current facility-administered medications for this visit.     Allergies   Allergen Reactions   ? Penicillins Hives   ? Vancomycin Hives         Lab Results    Chemistry/lipid CBC Cardiac Enzymes/BNP/TSH/INR   Lab Results   Component Value Date    CHOL 159 11/20/2019    HDL 43 11/20/2019    LDLCALC 88 11/20/2019    TRIG 140 11/20/2019    CREATININE 1.08 03/19/2020    BUN 22 03/19/2020    K 4.1 03/19/2020     03/19/2020     03/19/2020    CO2 23 03/19/2020    Lab Results   Component Value Date    WBC 8.3 03/19/2020    HGB 13.4 (L) 03/19/2020    HCT 41.1 03/19/2020    MCV 90 03/19/2020     (L) 03/19/2020    Lab Results   Component Value Date    TROPONINI <0.01 06/22/2011    BNP 20 06/21/2011    INR 1.17 (H) 03/17/2020

## 2021-06-29 NOTE — PROGRESS NOTES
"Progress Notes by Krzysztof Wolfe MD at 5/6/2020 10:50 AM     Author: Krzysztof Wolfe MD Service: -- Author Type: Physician    Filed: 5/6/2020 11:34 AM Encounter Date: 5/6/2020 Status: Signed    : Krzysztof Wolfe MD (Physician)           The patient has been notified of following:     \"This video visit will be conducted via a call between you and your physician/provider. We have found that certain health care needs can be provided without the need for an in-person physical exam.  This service lets us provide the care you need with a video conversation.  If a prescription is necessary we can send it directly to your pharmacy.  If lab work is needed we can place an order for that and you can then stop by our lab to have the test done at a later time.      Patient has given verbal consent to a Video visit? Yes    HEART CARE VIDEO ENCOUNTER        The patient has chosen to have the visit conducted as a video visit, to reduce risk of exposure given the current status of Coronavirus in our community. This video visit is being conducted via a call between the patient and physician/provider. Health care needs are being provided without a physical exam.     Assessment/Recommendations   Assessment/Plan:  1.  Status post transcatheter aortic valve replacement for severe symptomatic aortic stenosis, receiving a 29 mm S3 valve on March 17, 2020.  He has noticed a market improvement in his functional capacity, with complete resolution of his shortness of breath.  His 30-day echocardiogram was reviewed and shows normal function of his aortic bioprosthesis, with a mean gradient of 11 mmHg, and normal left ventricular systolic function.    2.  Coronary artery disease: He has a moderate stenosis in the RCA, which is being treated with risk factor modification.  He knows to call if there is a change in his symptoms, specifically chest pain or shortness of breath.  He did state that he was planning shoulder surgery in about 18 " months, and will benefit from a stress test prior to that.    Follow Up Plan:  6 months with Dr. Andre, 1 year with the valve clinic.  I have reviewed the note as documented.  This accurately captures the substance of my conversation with the patient.    Total time of video between patient and provider was 15 minutes       Originating Location (pt. Location): Home mitral    Distant Location (provider location):  [facility]      Mode of Communication:  Video Conference via [email or web address]       History of Present Illness/Subjective    Chandler Eldridge is a 70 y.o. male who is being evaluated via a billable video visit and has consented to a video visit.     Chandler Eldridge is a pleasant 70 y.o. male who is been known to have aortic valve disease for a number of years, historically mild to moderate aortic stenosis, but over the past year has had increasing symptoms of dyspnea on exertion as well as a declining functional capacity.     He had a repeat echocardiogram performed on December 20, 2019, that showed progression of his aortic valve disease to severe stenosis.  His left ventricular systolic function was normal to hyperdynamic with an EF of 65 to 70% and moderate concentric left ventricular hypertrophy.  The mean gradient on the echo was 36 mmHg, which was likely an underestimate based on the overall valve appearance and decreased leaflet excursion.     He had a right and left heart catheterization performed on January 22, 2020.  Which confirmed the presence of severe aortic stenosis with a mean gradient of 43 mmHg , And an aortic valve area of 0.8 cm .  His pulmonary pressures were normal.  There was moderate disease seen in the mid RCA, with no other significant epicardial coronary artery disease.    He underwent his transcatheter aortic valve replacement on March 17, 2020, receiving a 29 mm S3 valve via the right transfemoral approach.  His postprocedural course was relatively unremarkable.  He did  "develop a transient left bundle branch block, which resolved at the time of discharge.    At his 30-day follow-up today, he states that he feels \" excellent\".  He states that he has had complete resolution of his shortness of breath, and this has been accompanied by a significant improvement in his functional capacity.        I have reviewed and updated the patient's Past Medical History, Social History, Family History and Medication List.     Physical Examination performed via live video encounter Review of Systems   General Appearance:   no distress, normal body habitus, upright.   ENT/Mouth: membranes moist, no nasal discharge or bleeding gums.  Normal head shape, no evidence of injury or laceration.     EYES:  no scleral icterus, normal conjunctivae   Neck: no evidence of thyromegaly.  Supple   Chest/Lungs:   No audible wheezing equal chest wall expansion. Non labored breathing.  No cough.   Cardiovascular:   No evidence of elevated jugular venous pressure.  No evidence of pitting edema bilaterally    Abdomen:  no evidence of abdominal distention. No observe juandice.     Extremities: no cyanosis or clubbing noted.    Skin: no xanthelasma, normal skin color. No evidence of facial lacerations.      Neurologic: Normal arm motion bilateral, no tremors.  No evidence of focal defect.       Psychiatric: alert and oriented x3, calm                                               Medical History  Surgical History Family History Social History   Past Medical History:   Diagnosis Date   ? Aortic stenosis    ? Coronary artery disease    ? Family history of myocardial infarction     mom dad   ? GERD (gastroesophageal reflux disease)    ? Hyperlipidemia    ? Hypertension    ? SOB (shortness of breath)     Past Surgical History:   Procedure Laterality Date   ? CARDIAC CATHETERIZATION     ? CV CORONARY ANGIOGRAM N/A 1/22/2020    Procedure: Coronary Angiogram;  Surgeon: Jamar Almanza MD;  Location: [facility] Cath Lab;  " Service: Cardiology   ? CV LEFT HEART CATHETERIZATION WO LEFT VETRICULOGRAM Left 1/22/2020    Procedure: Left Heart Catheterization Without Left Ventriculogram;  Surgeon: Jamar Almanza MD;  Location: [facility] Cath Lab;  Service: Cardiology   ? CV RIGHT HEART CATH N/A 1/22/2020    Procedure: Right Heart Catheterization;  Surgeon: Jamar Almanza MD;  Location: [facility] Cath Lab;  Service: Cardiology   ? CV TRANSFEMORAL TRANSCATHETER VALVE REPLACEMENT N/A 3/17/2020    Procedure: Transfemoral Transcatheter Aortic Valve Replacement;  Surgeon: Jamar Almanza MD;  Location: [facility] Cath Lab;  Service: Cardiology   ? FOOT SURGERY Bilateral    ? HERNIA REPAIR  2002    BIHR done by Dr. Schmidt   ? LUMBAR SPINE SURGERY  2004   ? SPINAL FUSION  2009   ? SPINAL FUSION Bilateral 2009   ? TOTAL SHOULDER REPLACEMENT Left 11/2018    Family History   Problem Relation Age of Onset   ? Coronary artery disease Mother    ? Coronary artery disease Father       Social History     Socioeconomic History   ? Marital status:      Spouse name: Not on file   ? Number of children: Not on file   ? Years of education: Not on file   ? Highest education level: Not on file   Occupational History   ? Not on file   Social Needs   ? Financial resource strain: Not on file   ? Food insecurity     Worry: Not on file     Inability: Not on file   ? Transportation needs     Medical: Not on file     Non-medical: Not on file   Tobacco Use   ? Smoking status: Never Smoker   ? Smokeless tobacco: Never Used   Substance and Sexual Activity   ? Alcohol use: Yes     Comment: rare   ? Drug use: No   ? Sexual activity: Not on file   Lifestyle   ? Physical activity     Days per week: Not on file     Minutes per session: Not on file   ? Stress: Not on file   Relationships   ? Social connections     Talks on phone: Not on file     Gets together: Not on file     Attends Congregation service: Not on file     Active member of club or organization:  Not on file     Attends meetings of clubs or organizations: Not on file     Relationship status: Not on file   ? Intimate partner violence     Fear of current or ex partner: Not on file     Emotionally abused: Not on file     Physically abused: Not on file     Forced sexual activity: Not on file   Other Topics Concern   ? Not on file   Social History Narrative   ? Not on file          Medications  Allergies   Current Outpatient Medications   Medication Sig Dispense Refill   ? amLODIPine (NORVASC) 10 MG tablet Take 1 tablet (10 mg total) by mouth daily. 30 tablet 11   ? aspirin 81 MG EC tablet Take 81 mg by mouth daily.     ? atorvastatin (LIPITOR) 80 MG tablet Take 80 mg by mouth daily.     ? losartan (COZAAR) 100 MG tablet Take 100 mg by mouth at bedtime.     ? metoprolol succinate (TOPROL-XL) 25 MG Take 0.5 tablets (12.5 mg total) by mouth daily.  0     No current facility-administered medications for this visit.     Allergies   Allergen Reactions   ? Penicillins Hives   ? Vancomycin Hives         Lab Results    Chemistry/lipid CBC Cardiac Enzymes/BNP/TSH/INR   Lab Results   Component Value Date    CHOL 159 11/20/2019    HDL 43 11/20/2019    LDLCALC 88 11/20/2019    TRIG 140 11/20/2019    CREATININE 1.08 03/19/2020    BUN 22 03/19/2020    K 4.1 03/19/2020     03/19/2020     03/19/2020    CO2 23 03/19/2020    Lab Results   Component Value Date    WBC 8.3 03/19/2020    HGB 13.4 (L) 03/19/2020    HCT 41.1 03/19/2020    MCV 90 03/19/2020     (L) 03/19/2020    Lab Results   Component Value Date    TROPONINI <0.01 06/22/2011    BNP 20 06/21/2011    INR 1.17 (H) 03/17/2020        Krzysztof Wolfe MD

## 2021-06-30 NOTE — PROGRESS NOTES
Progress Notes by Leticia Miller PA-C at 4/5/2021  1:30 PM     Author: Leticia Miller PA-C Service: -- Author Type: Physician Assistant    Filed: 4/5/2021  4:03 PM Encounter Date: 4/5/2021 Status: Signed    : Leticia Miller PA-C (Physician Assistant)           Assessment/Recommendations   Diagnoses and all orders for this visit:    Hx of severe aortic stenosis - S/P TAVR on March 17, 2020 via the right transfemoral approach and using a #29 KIYA 3 valve.  Patient has had significant improvement in his breathing and overall functional status.  Can now play golf and walk the course once again.  1 year echocardiogram shows well-functioning bioprosthetic valve with mean gradient 12 mmHg and mild aortic insufficiency, which appears to be perivalvular and similar to 1 year ago.  Without symptoms, would not address the AI, but will have Dr. Wolfe review images.   He is currently NYHA class I heart failure.     Patient will now continue his cardiology care with his primary cardiologist, Dr. Andre and he will be due to see him in September.    Essential hypertension -blood pressure today is controlled.  Mostly at home blood pressures are in the 130s systolic.  Patient should continue taking amlodipine 10 mg daily, losartan 100 mg daily and metoprolol succinate 25 mg daily.  No room to increase metoprolol with HRs already in the high 50s, low 60s.  We are maxed out on the losartan.  Amlodipine can cause LE edema and we could decrease dose or stop this all together and put him on hydrochlorothiazide instead if it continues to be a problem.     CAD -with a 60% lesion in his RCA by coronary angiogram last January.  Patient has had no anginal symptoms, but had one episode of fatigue during exercise in early January.  With plans to have shoulder surgery in October.  Will discuss with Dr. Wolfe whether we should go ahead and stress test him now or wait until September prior to shoulder  surgery.  He will continue on aspirin, beta-blocker and statin therapy at this time.      Thank you for the opportunity to participate in the care of Chandler Eldridge. It's been a pleasure working with him.  Please do not hesitate to call with any questions or concerns.       History of Present Illness/Subjective    I had the opportunity to see Chandler Eldridge at the [facility] for his 1 year follow-up visit after transcatheter aortic valve replacement.  His wife accompanies him to the visit today.    Ren has history of severe aortic stenosis, moderate coronary artery disease, hypertension and obesity with BMI 35.5.  Last summer/fall he noticed that he was not able to play golf as many times a week as he used to and when he did play he would need to use a cart and not walk the course because he was too fatigued/short of breath.  Echocardiogram in December 2019 showed progression of his aortic valve to now severe range.  He ended up undergoing TAVR in March 2020.    Since valve replacement, his breathing has improved greatly.  He has started playing golf and is now able to walk the course once again.  He tells me that in January of this year he decided to take up exercise again and was doing so in his basement.  He did this for a couple of weeks and then noticed acute fatigue level so stopped exercising.  He never had any shortness of breath or angina associated with the exercise and now that he is golfing, he has no more of those symptoms, but is worried about the coronary disease that was found on his precoronary angiogram prior to valve replacement.  He also brings a copy of his echo results and is concerned with the mild aortic insufficiency seen by echocardiogram.    Chandler Eldridge denies exertional chest discomfort, palpitations, shortness of breath at rest, PND, orthopnea, lightheadedness, dizziness, pre-syncope or syncope.  Chandler Eldridge also denies any recent weight loss, changes in  appetite, nausea or vomiting.   ____________________________________________________________  1 year echo from March 25, 2021:    Left ventricle ejection fraction is normal. The calculated left ventricular ejection fraction is 65% without wall motion abnormality.    Normal right ventricular size and systolic function.    Normal function of a bioprosthetic aortic valve with mean gradient of 12 mmHg. There is mild aortic insufficiency which appears to be paravalvular.    When compared to the previous study dated 5/4/2020, mild aortic insufficiency now identified.     Physical Examination Review of Systems   Vitals:    04/05/21 1406   BP: 130/64   Pulse: 72     Body mass index is 35.58 kg/m .  Wt Readings from Last 3 Encounters:   04/05/21 (!) 234 lb (106.1 kg)   03/25/21 (!) 234 lb (106.1 kg)   09/24/20 (!) 234 lb (106.1 kg)       General Appearance:   Alert, cooperative and in no acute distress   ENT/Mouth: membranes moist, no oral lesions or bleeding gums.      EYES:  no scleral icterus, normal conjunctivae   Neck: Supple without lymphadenopathy.  Thyroid not visualized   Chest/Lungs:   lungs are clear to auscultation, no rales or wheezing   Cardiovascular:   Regular. Normal first and second heart sounds with no murmurs, rubs, or gallops; the carotid, radial and posterior tibial pulses are intact, mild edema bilaterally    Abdomen:  Soft and nontender. Bowel sounds are present in all quadrants   Extremities: no cyanosis or clubbing.    Skin: no xanthelasma, warm.    Neurologic: normal gait, normal  bilateral, no tremors   Psychiatric: Normal mood and affect    General: WNL  Eyes: WNL  Ears/Nose/Throat: WNL  Lungs: WNL  Heart: WNL  Stomach: WNL  Bladder: WNL  Muscle/Joints: WNL  Skin: WNL  Nervous System: WNL  Mental Health: WNL     Blood: WNL     Medical History  Surgical History Family History Social History   Past Medical History:   Diagnosis Date   ? Aortic stenosis    ? Coronary artery disease    ? Family  history of myocardial infarction     mom dad   ? GERD (gastroesophageal reflux disease)    ? Hyperlipidemia    ? Hypertension    ? SOB (shortness of breath)     Past Surgical History:   Procedure Laterality Date   ? CARDIAC CATHETERIZATION     ? CV CORONARY ANGIOGRAM N/A 1/22/2020    Procedure: Coronary Angiogram;  Surgeon: Jamar Almanza MD;  Location: [facility] Cath Lab;  Service: Cardiology   ? CV LEFT HEART CATHETERIZATION WO LEFT VETRICULOGRAM Left 1/22/2020    Procedure: Left Heart Catheterization Without Left Ventriculogram;  Surgeon: Jamar Almanza MD;  Location: [facility] Cath Lab;  Service: Cardiology   ? CV RIGHT HEART CATH N/A 1/22/2020    Procedure: Right Heart Catheterization;  Surgeon: Jamar Almanza MD;  Location: [facility] Cath Lab;  Service: Cardiology   ? CV TRANSFEMORAL TRANSCATHETER VALVE REPLACEMENT N/A 3/17/2020    Procedure: Transfemoral Transcatheter Aortic Valve Replacement;  Surgeon: Jamar Almanza MD;  Location: [facility] Cath Lab;  Service: Cardiology   ? FOOT SURGERY Bilateral    ? HERNIA REPAIR  2002    BIHR done by Dr. Schmidt   ? LUMBAR SPINE SURGERY  2004   ? SPINAL FUSION  2009   ? SPINAL FUSION Bilateral 2009   ? TOTAL SHOULDER REPLACEMENT Left 11/2018    Family History   Problem Relation Age of Onset   ? Coronary artery disease Mother    ? Coronary artery disease Father     Social History     Socioeconomic History   ? Marital status:      Spouse name: Not on file   ? Number of children: Not on file   ? Years of education: Not on file   ? Highest education level: Not on file   Occupational History   ? Not on file   Social Needs   ? Financial resource strain: Not on file   ? Food insecurity     Worry: Not on file     Inability: Not on file   ? Transportation needs     Medical: Not on file     Non-medical: Not on file   Tobacco Use   ? Smoking status: Never Smoker   ? Smokeless tobacco: Never Used   Substance and Sexual Activity   ? Alcohol use: Yes      Comment: rare   ? Drug use: No   ? Sexual activity: Not on file   Lifestyle   ? Physical activity     Days per week: Not on file     Minutes per session: Not on file   ? Stress: Not on file   Relationships   ? Social connections     Talks on phone: Not on file     Gets together: Not on file     Attends [employer] service: Not on file     Active member of club or organization: Not on file     Attends meetings of clubs or organizations: Not on file     Relationship status: Not on file   ? Intimate partner violence     Fear of current or ex partner: Not on file     Emotionally abused: Not on file     Physically abused: Not on file     Forced sexual activity: Not on file   Other Topics Concern   ? Not on file   Social History Narrative   ? Not on file          Medications  Allergies   Current Outpatient Medications   Medication Sig Dispense Refill   ? amLODIPine (NORVASC) 10 MG tablet Take 1 tablet (10 mg total) by mouth daily. 90 tablet 3   ? aspirin 81 MG EC tablet Take 81 mg by mouth daily.     ? atorvastatin (LIPITOR) 80 MG tablet Take 80 mg by mouth daily.     ? losartan (COZAAR) 100 MG tablet Take 100 mg by mouth at bedtime.     ? metoprolol succinate (TOPROL-XL) 25 MG Take 1 tablet (25 mg total) by mouth daily. 90 tablet 1     No current facility-administered medications for this visit.     Allergies   Allergen Reactions   ? Penicillins Hives   ? Vancomycin Hives         Lab Results    Chemistry/lipid CBC Cardiac Enzymes/BNP/TSH/INR   Lab Results   Component Value Date    CHOL 134 10/06/2020    HDL 46 10/06/2020    LDLCALC 70 10/06/2020    TRIG 92 10/06/2020    CREATININE 1.08 03/19/2020    BUN 22 03/19/2020    K 4.1 03/19/2020     03/19/2020     03/19/2020    CO2 23 03/19/2020    Lab Results   Component Value Date    WBC 8.3 03/19/2020    HGB 13.4 (L) 03/19/2020    HCT 41.1 03/19/2020    MCV 90 03/19/2020     (L) 03/19/2020    Lab Results   Component Value Date    TROPONINI <0.01 06/22/2011     BNP 20 06/21/2011    INR 1.17 (H) 03/17/2020        40 minutes spent on the date of encounter doing chart review, review of test results, interpretation with above tests, patient visit, documentation and discussion with family.      This note has been dictated using voice recognition software. Any grammatical or context distortions are unintentional and inherent to the software.

## 2021-08-14 ENCOUNTER — HEALTH MAINTENANCE LETTER (OUTPATIENT)
Age: 71
End: 2021-08-14

## 2021-09-15 ENCOUNTER — LAB REQUISITION (OUTPATIENT)
Dept: LAB | Facility: CLINIC | Age: 71
End: 2021-09-15

## 2021-09-15 DIAGNOSIS — I10 ESSENTIAL (PRIMARY) HYPERTENSION: ICD-10-CM

## 2021-09-15 LAB
ANION GAP SERPL CALCULATED.3IONS-SCNC: 11 MMOL/L (ref 5–18)
BUN SERPL-MCNC: 14 MG/DL (ref 8–28)
CALCIUM SERPL-MCNC: 9.9 MG/DL (ref 8.5–10.5)
CHLORIDE BLD-SCNC: 104 MMOL/L (ref 98–107)
CO2 SERPL-SCNC: 25 MMOL/L (ref 22–31)
CREAT SERPL-MCNC: 1.14 MG/DL (ref 0.7–1.3)
GFR SERPL CREATININE-BSD FRML MDRD: 64 ML/MIN/1.73M2
GLUCOSE BLD-MCNC: 116 MG/DL (ref 70–125)
POTASSIUM BLD-SCNC: 4.8 MMOL/L (ref 3.5–5)
SODIUM SERPL-SCNC: 140 MMOL/L (ref 136–145)

## 2021-09-15 PROCEDURE — 36415 COLL VENOUS BLD VENIPUNCTURE: CPT | Performed by: NURSE PRACTITIONER

## 2021-09-15 PROCEDURE — 80048 BASIC METABOLIC PNL TOTAL CA: CPT | Performed by: NURSE PRACTITIONER

## 2021-09-20 ENCOUNTER — LAB REQUISITION (OUTPATIENT)
Dept: LAB | Facility: CLINIC | Age: 71
End: 2021-09-20
Payer: COMMERCIAL

## 2021-09-20 DIAGNOSIS — Z01.812 ENCOUNTER FOR PREPROCEDURAL LABORATORY EXAMINATION: ICD-10-CM

## 2021-09-20 PROCEDURE — U0003 INFECTIOUS AGENT DETECTION BY NUCLEIC ACID (DNA OR RNA); SEVERE ACUTE RESPIRATORY SYNDROME CORONAVIRUS 2 (SARS-COV-2) (CORONAVIRUS DISEASE [COVID-19]), AMPLIFIED PROBE TECHNIQUE, MAKING USE OF HIGH THROUGHPUT TECHNOLOGIES AS DESCRIBED BY CMS-2020-01-R: HCPCS | Mod: ORL | Performed by: NURSE PRACTITIONER

## 2021-09-21 LAB — SARS-COV-2 RNA RESP QL NAA+PROBE: NEGATIVE

## 2021-10-10 ENCOUNTER — HEALTH MAINTENANCE LETTER (OUTPATIENT)
Age: 71
End: 2021-10-10

## 2022-01-12 VITALS — HEIGHT: 68 IN | WEIGHT: 230 LBS | BODY MASS INDEX: 34.86 KG/M2

## 2022-01-18 VITALS
DIASTOLIC BLOOD PRESSURE: 80 MMHG | RESPIRATION RATE: 14 BRPM | HEART RATE: 71 BPM | BODY MASS INDEX: 34.06 KG/M2 | DIASTOLIC BLOOD PRESSURE: 72 MMHG | BODY MASS INDEX: 34.8 KG/M2 | SYSTOLIC BLOOD PRESSURE: 136 MMHG | HEIGHT: 69 IN | WEIGHT: 235 LBS | WEIGHT: 224 LBS | HEART RATE: 58 BPM | SYSTOLIC BLOOD PRESSURE: 144 MMHG

## 2022-01-18 VITALS
SYSTOLIC BLOOD PRESSURE: 131 MMHG | HEIGHT: 68 IN | DIASTOLIC BLOOD PRESSURE: 76 MMHG | BODY MASS INDEX: 34.67 KG/M2 | WEIGHT: 228 LBS | DIASTOLIC BLOOD PRESSURE: 73 MMHG | SYSTOLIC BLOOD PRESSURE: 156 MMHG | WEIGHT: 234 LBS | RESPIRATION RATE: 16 BRPM | BODY MASS INDEX: 35.46 KG/M2 | HEART RATE: 72 BPM | TEMPERATURE: 97.8 F | HEART RATE: 63 BPM

## 2022-01-18 VITALS
DIASTOLIC BLOOD PRESSURE: 64 MMHG | BODY MASS INDEX: 35.46 KG/M2 | SYSTOLIC BLOOD PRESSURE: 130 MMHG | WEIGHT: 234 LBS | HEIGHT: 68 IN | HEART RATE: 72 BPM

## 2022-01-18 VITALS
BODY MASS INDEX: 34.82 KG/M2 | WEIGHT: 229.8 LBS | WEIGHT: 232.7 LBS | HEIGHT: 68 IN | BODY MASS INDEX: 34.82 KG/M2 | WEIGHT: 228 LBS | WEIGHT: 229 LBS | WEIGHT: 231.8 LBS | BODY MASS INDEX: 35.27 KG/M2 | WEIGHT: 229 LBS | BODY MASS INDEX: 34.82 KG/M2 | BODY MASS INDEX: 34.67 KG/M2 | BODY MASS INDEX: 34.94 KG/M2 | BODY MASS INDEX: 35.25 KG/M2 | WEIGHT: 229 LBS

## 2022-01-18 VITALS — WEIGHT: 220 LBS | BODY MASS INDEX: 33.34 KG/M2 | HEIGHT: 68 IN

## 2022-01-18 VITALS
HEIGHT: 68 IN | DIASTOLIC BLOOD PRESSURE: 82 MMHG | SYSTOLIC BLOOD PRESSURE: 180 MMHG | HEART RATE: 56 BPM | RESPIRATION RATE: 16 BRPM | BODY MASS INDEX: 35.92 KG/M2 | WEIGHT: 237 LBS

## 2022-01-18 VITALS
BODY MASS INDEX: 36.07 KG/M2 | RESPIRATION RATE: 14 BRPM | HEART RATE: 63 BPM | HEIGHT: 68 IN | SYSTOLIC BLOOD PRESSURE: 140 MMHG | WEIGHT: 238 LBS | DIASTOLIC BLOOD PRESSURE: 78 MMHG

## 2022-01-18 VITALS — WEIGHT: 229 LBS | BODY MASS INDEX: 34.82 KG/M2

## 2022-03-14 ENCOUNTER — HOSPITAL ENCOUNTER (OUTPATIENT)
Dept: CARDIOLOGY | Facility: CLINIC | Age: 72
Discharge: HOME | End: 2022-03-14
Attending: INTERNAL MEDICINE | Admitting: INTERNAL MEDICINE
Payer: COMMERCIAL

## 2022-03-14 DIAGNOSIS — Z95.2 S/P TAVR (TRANSCATHETER AORTIC VALVE REPLACEMENT): ICD-10-CM

## 2022-03-14 PROCEDURE — 93306 TTE W/DOPPLER COMPLETE: CPT

## 2022-03-14 PROCEDURE — 93306 TTE W/DOPPLER COMPLETE: CPT | Mod: 26 | Performed by: INTERNAL MEDICINE

## 2022-03-17 ENCOUNTER — OFFICE VISIT (OUTPATIENT)
Dept: CARDIOLOGY | Facility: CLINIC | Age: 72
End: 2022-03-17
Payer: COMMERCIAL

## 2022-03-17 VITALS
DIASTOLIC BLOOD PRESSURE: 74 MMHG | RESPIRATION RATE: 20 BRPM | HEIGHT: 68 IN | WEIGHT: 242.3 LBS | HEART RATE: 84 BPM | BODY MASS INDEX: 36.72 KG/M2 | SYSTOLIC BLOOD PRESSURE: 142 MMHG

## 2022-03-17 DIAGNOSIS — I25.10 ATHEROSCLEROSIS OF NATIVE CORONARY ARTERY OF NATIVE HEART WITHOUT ANGINA PECTORIS: Primary | ICD-10-CM

## 2022-03-17 DIAGNOSIS — E66.01 MORBID OBESITY (H): ICD-10-CM

## 2022-03-17 DIAGNOSIS — Z95.2 S/P TAVR (TRANSCATHETER AORTIC VALVE REPLACEMENT): ICD-10-CM

## 2022-03-17 DIAGNOSIS — I10 ESSENTIAL HYPERTENSION: ICD-10-CM

## 2022-03-17 PROCEDURE — 99213 OFFICE O/P EST LOW 20 MIN: CPT | Performed by: INTERNAL MEDICINE

## 2022-03-17 RX ORDER — AMLODIPINE BESYLATE 10 MG/1
1 TABLET ORAL DAILY
COMMUNITY
End: 2022-05-11

## 2022-03-17 RX ORDER — ASPIRIN 81 MG/1
81 TABLET ORAL DAILY
COMMUNITY

## 2022-03-17 NOTE — PROGRESS NOTES
[facility]  [phone]          Assessment/Recommendations   Patient with known valvular heart disease, status post transcutaneous aortic valve replacement as well as mild to moderate coronary artery disease.  His blood pressure is not at goal today but he has had a upper respiratory infection and was late for his clinic appointment so was rushing.  He will take some blood pressures at home over the next couple of weeks and send them to us to make sure that he is at goal.  If not we will adjust his antihypertensive medications.    He will get back to his exercise routine and since his infection clears as he was doing very well this calendar year.    He will check a fasting lipid panel at his primary care physician's office.  Our goal would be to keep his LDL cholesterol less than 70.    We will plan follow-up in 1 year, but of course would be happy to see him sooner if questions or problems arise.    Thank you for allowing us to participate in his care.       History of Present Illness/Subjective    Mr. Chandler Eldridge is a 72 year old male with known valvular heart disease, status post transcutaneous aortic valve replacement.  He got quite significant improvement in his dyspnea on exertion after replacement of his aortic valve.  He has been feeling well except he picked up a bronchitis respiratory infection recently and has been coughing and his feet feels like he is getting over that but still not quite over it.  He has not had any chest discomfort.  Prior to this respiratory infection his breathing was good he was working out almost daily on a treadmill and other exercise equipment.  He is not had any anginal pain with exercise.  His breathing is been comfortable.  No peripheral edema.  No syncopal or near syncopal episodes or palpitations.    Echocardiogram showed normal left ventricular systolic function and normally functioning prosthetic valve in the aortic position.       Physical  "Examination Review of Systems   BP (!) 142/74   Pulse 84   Resp 20   Ht 1.727 m (5' 8\")   Wt 109.9 kg (242 lb 4.8 oz)   BMI 36.84 kg/m    Body mass index is 36.84 kg/m .  Wt Readings from Last 3 Encounters:   03/17/22 109.9 kg (242 lb 4.8 oz)   05/11/21 104.3 kg (230 lb)   05/11/21 104.3 kg (230 lb)     General Appearance:   Alert, cooperative and in no acute distress.   ENT/Mouth: Patient wearing a mask.      EYES:  no scleral icterus, normal conjunctivae   Neck: JVP normal. No Hepatojugular reflux. Thyroid not visualized.   Chest/Lungs:   Lungs are clear to auscultation, equal chest wall expansion.   Cardiovascular:   S1, S2 with 1/6 systolic murmur , no clicks or rubs. Brachial, radial and posterior tibial pulses are intact and symetric. No carotid bruits noted   Abdomen:  Nontender. BS+ rounded.   Extremities: No cyanosis, clubbing or edema   Skin: no xanthelasma, warm.    Neurologic: normal arm movement bilateral, no tremors     Psychiatric: Appropriate affect.      Enc Vitals  BP: (!) 142/74  Pulse: 84  Resp: 20  Weight: 109.9 kg (242 lb 4.8 oz)  Height: 172.7 cm (5' 8\")                                           Medical History  Surgical History Family History Social History   Past Medical History:   Diagnosis Date     Aortic stenosis      Arthritis      Coronary artery disease      Family history of myocardial infarction     mom dad     GERD (gastroesophageal reflux disease)      Hyperlipidemia      Hypertension      PONV (postoperative nausea and vomiting)      SOB (shortness of breath)     Past Surgical History:   Procedure Laterality Date     CARDIAC CATHETERIZATION       CHOLECYSTECTOMY  2017     CV CORONARY ANGIOGRAM N/A 1/22/2020    Procedure: Coronary Angiogram;  Surgeon: Jamar Almanza MD;  Location: [facility] Lab;  Service: Cardiology     CV LEFT HEART CATHETERIZATION WITHOUT LEFT VENTRICULOGRAM Left 1/22/2020    Procedure: Left Heart Catheterization Without Left Ventriculogram;  " Surgeon: Jamar Almanza MD;  Location: [facility] Cath Lab;  Service: Cardiology     CV RIGHT HEART CATHETERIZATION N/A 1/22/2020    Procedure: Right Heart Catheterization;  Surgeon: Jamar Almanza MD;  Location: [facility] Cath Lab;  Service: Cardiology     CV TRANSCATHETER AORTIC VAVLE REPLACEMENT - FEMORAL APPROACH N/A 3/17/2020    Procedure: Transfemoral Transcatheter Aortic Valve Replacement;  Surgeon: Jamar Almanza MD;  Location: [facility] Lab;  Service: Cardiology     ENDOSCOPIC RELEASE ULNAR NERVE (ELBOW) Left 10/5/2018    Procedure: ENDOSCOPIC RELEASE ULNAR NERVE (ELBOW);  Left Arm Endoscopic Cubital Tunnel Release;  Surgeon: Gene Garcia MD;  Location: [place] [facility]     FOOT SURGERY Bilateral      HERNIA REPAIR  2002    BIHR done by Dr. Schmidt     LUMBAR SPINE SURGERY  2004    Decompression     SPINAL FUSION Bilateral 2009     TOTAL SHOULDER REPLACEMENT Left 11/2018     ZZC TOTAL HIP ARTHROPLASTY Left 5/11/2021    Procedure: LEFT TOTAL HIP ARTHROPLASTY;  Surgeon: Gene Corea MD;  Location: [facility];  Service: Orthopedics    Family History   Problem Relation Age of Onset     Coronary Artery Disease Mother      Coronary Artery Disease Father     Social History     Socioeconomic History     Marital status:      Spouse name: Not on file     Number of children: Not on file     Years of education: Not on file     Highest education level: Not on file   Occupational History     Not on file   Tobacco Use     Smoking status: Never Smoker     Smokeless tobacco: Never Used   Substance and Sexual Activity     Alcohol use: Not Currently     Drug use: No     Sexual activity: Yes   Other Topics Concern     Not on file   Social History Narrative     Not on file     Social Determinants of Health     Financial Resource Strain: Not on file   Food Insecurity: Not on file   Transportation Needs: Not on file   Physical Activity: Not on file   Stress: Not on file   Social  Connections: Not on file   Intimate Partner Violence: Not on file   Housing Stability: Not on file          Medications  Allergies   Current Outpatient Medications   Medication Sig Dispense Refill     aspirin 81 MG EC tablet Take 81 mg by mouth daily       atorvastatin (LIPITOR) 80 MG tablet Take 80 mg by mouth daily       metoprolol succinate ER (TOPROL-XL) 12.5 mg TABS 24 hr half-tab Take 12.5 mg by mouth daily       amLODIPine (NORVASC) 10 MG tablet Take 1 tablet by mouth daily       HYDROcodone-acetaminophen (NORCO) 5-325 MG per tablet Take 1-2 tablets by mouth every 4 hours as needed (Moderate to Severe Pain) 10 tablet 0     LOSARTAN POTASSIUM PO Take 100 mg by mouth daily       Allergies   Allergen Reactions     Vancomycin Anxiety, Difficulty breathing, Hives, Itching, Other (See Comments), Palpitations and Rash     Other reaction(s): rash/hypertension/  Patient expresses that he will not use vancomycin again- d/t rash/itch/increased BP (has not tried premedication to tolerate.) He has tolerated clindamycin oral in the past.       Anesthesia S-I-40 [Kdc:Egg Phospholipids+Sodium Metabisulfite+Soybean Oil+Propofol]      Other reaction(s): nausea     Niacin Other (See Comments)     Other reaction(s): flushing 11/02  flushing  Flushing       Oxycodone-Acetaminophen      Other reaction(s): Gastrointestinal  N/V     Pcn [Penicillins] Rash         Lab Results    Chemistry/lipid CBC Cardiac Enzymes/BNP/TSH/INR   Lab Results   Component Value Date    CHOL 147 04/13/2021    HDL 44 04/13/2021    TRIG 132 04/13/2021    BUN 14 09/15/2021     09/15/2021    CO2 25 09/15/2021    Lab Results   Component Value Date    WBC 8.4 04/05/2021    HGB 11.8 (L) 05/12/2021    HCT 40.6 04/05/2021    MCV 88 04/05/2021     04/05/2021    Lab Results   Component Value Date    INR 1.05 05/11/2021

## 2022-03-17 NOTE — LETTER
3/17/2022    Yonathan Bell MD  [facility] [address] [place] [id number]    RE: Chandler Eldridge       Dear Colleague,     I had the pleasure of seeing Chandler Eldridge in the [facility].      [facility]  [phone]          Assessment/Recommendations   Patient with known valvular heart disease, status post transcutaneous aortic valve replacement as well as mild to moderate coronary artery disease.  His blood pressure is not at goal today but he has had a upper respiratory infection and was late for his clinic appointment so was rushing.  He will take some blood pressures at home over the next couple of weeks and send them to us to make sure that he is at goal.  If not we will adjust his antihypertensive medications.    He will get back to his exercise routine and since his infection clears as he was doing very well this calendar year.    He will check a fasting lipid panel at his primary care physician's office.  Our goal would be to keep his LDL cholesterol less than 70.    We will plan follow-up in 1 year, but of course would be happy to see him sooner if questions or problems arise.    Thank you for allowing us to participate in his care.       History of Present Illness/Subjective    Mr. Chandler Eldridge is a 72 year old male with known valvular heart disease, status post transcutaneous aortic valve replacement.  He got quite significant improvement in his dyspnea on exertion after replacement of his aortic valve.  He has been feeling well except he picked up a bronchitis respiratory infection recently and has been coughing and his feet feels like he is getting over that but still not quite over it.  He has not had any chest discomfort.  Prior to this respiratory infection his breathing was good he was working out almost daily on a treadmill and other exercise equipment.  He is not had any anginal pain with exercise.  His breathing is been comfortable.  No  "peripheral edema.  No syncopal or near syncopal episodes or palpitations.    Echocardiogram showed normal left ventricular systolic function and normally functioning prosthetic valve in the aortic position.       Physical Examination Review of Systems   BP (!) 142/74   Pulse 84   Resp 20   Ht 1.727 m (5' 8\")   Wt 109.9 kg (242 lb 4.8 oz)   BMI 36.84 kg/m    Body mass index is 36.84 kg/m .  Wt Readings from Last 3 Encounters:   03/17/22 109.9 kg (242 lb 4.8 oz)   05/11/21 104.3 kg (230 lb)   05/11/21 104.3 kg (230 lb)     General Appearance:   Alert, cooperative and in no acute distress.   ENT/Mouth: Patient wearing a mask.      EYES:  no scleral icterus, normal conjunctivae   Neck: JVP normal. No Hepatojugular reflux. Thyroid not visualized.   Chest/Lungs:   Lungs are clear to auscultation, equal chest wall expansion.   Cardiovascular:   S1, S2 with 1/6 systolic murmur , no clicks or rubs. Brachial, radial and posterior tibial pulses are intact and symetric. No carotid bruits noted   Abdomen:  Nontender. BS+ rounded.   Extremities: No cyanosis, clubbing or edema   Skin: no xanthelasma, warm.    Neurologic: normal arm movement bilateral, no tremors     Psychiatric: Appropriate affect.      Enc Vitals  BP: (!) 142/74  Pulse: 84  Resp: 20  Weight: 109.9 kg (242 lb 4.8 oz)  Height: 172.7 cm (5' 8\")                                           Medical History  Surgical History Family History Social History   Past Medical History:   Diagnosis Date     Aortic stenosis      Arthritis      Coronary artery disease      Family history of myocardial infarction     mom dad     GERD (gastroesophageal reflux disease)      Hyperlipidemia      Hypertension      PONV (postoperative nausea and vomiting)      SOB (shortness of breath)     Past Surgical History:   Procedure Laterality Date     CARDIAC CATHETERIZATION       CHOLECYSTECTOMY  2017     CV CORONARY ANGIOGRAM N/A 1/22/2020    Procedure: Coronary Angiogram;  Surgeon: " Jamar Almanza MD;  Location: [facility] Lab;  Service: Cardiology     CV LEFT HEART CATHETERIZATION WITHOUT LEFT VENTRICULOGRAM Left 1/22/2020    Procedure: Left Heart Catheterization Without Left Ventriculogram;  Surgeon: Jamar Almanza MD;  Location: [facility] Lab;  Service: Cardiology     CV RIGHT HEART CATHETERIZATION N/A 1/22/2020    Procedure: Right Heart Catheterization;  Surgeon: Jamar Almanza MD;  Location: [facility] Lab;  Service: Cardiology     CV TRANSCATHETER AORTIC VAVLE REPLACEMENT - FEMORAL APPROACH N/A 3/17/2020    Procedure: Transfemoral Transcatheter Aortic Valve Replacement;  Surgeon: Jamar Almanza MD;  Location: [facility] Lab;  Service: Cardiology     ENDOSCOPIC RELEASE ULNAR NERVE (ELBOW) Left 10/5/2018    Procedure: ENDOSCOPIC RELEASE ULNAR NERVE (ELBOW);  Left Arm Endoscopic Cubital Tunnel Release;  Surgeon: Gene Garcia MD;  Location: [place] [facility]     FOOT SURGERY Bilateral      HERNIA REPAIR  2002    BIHR done by Dr. Schmidt     LUMBAR SPINE SURGERY  2004    Decompression     SPINAL FUSION Bilateral 2009     TOTAL SHOULDER REPLACEMENT Left 11/2018     ZZC TOTAL HIP ARTHROPLASTY Left 5/11/2021    Procedure: LEFT TOTAL HIP ARTHROPLASTY;  Surgeon: Gene Corea MD;  Location: [facility];  Service: Orthopedics    Family History   Problem Relation Age of Onset     Coronary Artery Disease Mother      Coronary Artery Disease Father     Social History     Socioeconomic History     Marital status:      Spouse name: Not on file     Number of children: Not on file     Years of education: Not on file     Highest education level: Not on file   Occupational History     Not on file   Tobacco Use     Smoking status: Never Smoker     Smokeless tobacco: Never Used   Substance and Sexual Activity     Alcohol use: Not Currently     Drug use: No     Sexual activity: Yes   Other Topics Concern     Not on file   Social History Narrative      Not on file     Social Determinants of Health     Financial Resource Strain: Not on file   Food Insecurity: Not on file   Transportation Needs: Not on file   Physical Activity: Not on file   Stress: Not on file   Social Connections: Not on file   Intimate Partner Violence: Not on file   Housing Stability: Not on file          Medications  Allergies   Current Outpatient Medications   Medication Sig Dispense Refill     aspirin 81 MG EC tablet Take 81 mg by mouth daily       atorvastatin (LIPITOR) 80 MG tablet Take 80 mg by mouth daily       metoprolol succinate ER (TOPROL-XL) 12.5 mg TABS 24 hr half-tab Take 12.5 mg by mouth daily       amLODIPine (NORVASC) 10 MG tablet Take 1 tablet by mouth daily       HYDROcodone-acetaminophen (NORCO) 5-325 MG per tablet Take 1-2 tablets by mouth every 4 hours as needed (Moderate to Severe Pain) 10 tablet 0     LOSARTAN POTASSIUM PO Take 100 mg by mouth daily       Allergies   Allergen Reactions     Vancomycin Anxiety, Difficulty breathing, Hives, Itching, Other (See Comments), Palpitations and Rash     Other reaction(s): rash/hypertension/  Patient expresses that he will not use vancomycin again- d/t rash/itch/increased BP (has not tried premedication to tolerate.) He has tolerated clindamycin oral in the past.       Anesthesia S-I-40 [Kdc:Egg Phospholipids+Sodium Metabisulfite+Soybean Oil+Propofol]      Other reaction(s): nausea     Niacin Other (See Comments)     Other reaction(s): flushing 11/02  flushing  Flushing       Oxycodone-Acetaminophen      Other reaction(s): Gastrointestinal  N/V     Pcn [Penicillins] Rash         Lab Results    Chemistry/lipid CBC Cardiac Enzymes/BNP/TSH/INR   Lab Results   Component Value Date    CHOL 147 04/13/2021    HDL 44 04/13/2021    TRIG 132 04/13/2021    BUN 14 09/15/2021     09/15/2021    CO2 25 09/15/2021    Lab Results   Component Value Date    WBC 8.4 04/05/2021    HGB 11.8 (L) 05/12/2021    HCT 40.6 04/05/2021    MCV 88  04/05/2021     04/05/2021    Lab Results   Component Value Date    INR 1.05 05/11/2021

## 2022-03-17 NOTE — PATIENT INSTRUCTIONS
[facility]  [phone]    Chandler Eldridge,    It was a pleasure to see you today at the [facility].     My recommendations after this visit include:    1.  Please check your blood pressure 3-4 times a week and send us the readings in a couple of weeks.  You can call them to Marianna Dias RN at [phone] or send them via [email or web address].    2.  Please check your lipid panel with your primary care doctor in the next 2 to 4 weeks.        Tico Andre

## 2022-04-15 ENCOUNTER — LAB REQUISITION (OUTPATIENT)
Dept: LAB | Facility: CLINIC | Age: 72
End: 2022-04-15

## 2022-04-15 DIAGNOSIS — Z00.00 ENCOUNTER FOR GENERAL ADULT MEDICAL EXAMINATION WITHOUT ABNORMAL FINDINGS: ICD-10-CM

## 2022-04-15 DIAGNOSIS — E78.5 HYPERLIPIDEMIA, UNSPECIFIED: ICD-10-CM

## 2022-04-15 DIAGNOSIS — I10 ESSENTIAL (PRIMARY) HYPERTENSION: ICD-10-CM

## 2022-04-15 LAB
ALBUMIN SERPL-MCNC: 3.9 G/DL (ref 3.5–5)
ALP SERPL-CCNC: 129 U/L (ref 45–120)
ALT SERPL W P-5'-P-CCNC: 22 U/L (ref 0–45)
ANION GAP SERPL CALCULATED.3IONS-SCNC: 12 MMOL/L (ref 5–18)
AST SERPL W P-5'-P-CCNC: 23 U/L (ref 0–40)
BILIRUB SERPL-MCNC: 1.7 MG/DL (ref 0–1)
BUN SERPL-MCNC: 17 MG/DL (ref 8–28)
CALCIUM SERPL-MCNC: 9.9 MG/DL (ref 8.5–10.5)
CHLORIDE BLD-SCNC: 103 MMOL/L (ref 98–107)
CHOLEST SERPL-MCNC: 162 MG/DL
CO2 SERPL-SCNC: 24 MMOL/L (ref 22–31)
CREAT SERPL-MCNC: 1.19 MG/DL (ref 0.7–1.3)
GFR SERPL CREATININE-BSD FRML MDRD: 65 ML/MIN/1.73M2
GLUCOSE BLD-MCNC: 122 MG/DL (ref 70–125)
HDLC SERPL-MCNC: 42 MG/DL
LDLC SERPL CALC-MCNC: 94 MG/DL
POTASSIUM BLD-SCNC: 4.5 MMOL/L (ref 3.5–5)
PROT SERPL-MCNC: 7.2 G/DL (ref 6–8)
PSA SERPL-MCNC: 1.05 UG/L (ref 0–6.5)
SODIUM SERPL-SCNC: 139 MMOL/L (ref 136–145)
TRIGL SERPL-MCNC: 128 MG/DL

## 2022-04-15 PROCEDURE — 80053 COMPREHEN METABOLIC PANEL: CPT | Performed by: STUDENT IN AN ORGANIZED HEALTH CARE EDUCATION/TRAINING PROGRAM

## 2022-04-15 PROCEDURE — G0103 PSA SCREENING: HCPCS | Performed by: STUDENT IN AN ORGANIZED HEALTH CARE EDUCATION/TRAINING PROGRAM

## 2022-04-15 PROCEDURE — 80061 LIPID PANEL: CPT | Performed by: STUDENT IN AN ORGANIZED HEALTH CARE EDUCATION/TRAINING PROGRAM

## 2022-04-22 ENCOUNTER — LAB REQUISITION (OUTPATIENT)
Dept: LAB | Facility: CLINIC | Age: 72
End: 2022-04-22

## 2022-04-22 DIAGNOSIS — L98.9 DISORDER OF THE SKIN AND SUBCUTANEOUS TISSUE, UNSPECIFIED: ICD-10-CM

## 2022-04-22 PROCEDURE — 88305 TISSUE EXAM BY PATHOLOGIST: CPT | Performed by: PATHOLOGY

## 2022-04-26 LAB
PATH REPORT.COMMENTS IMP SPEC: NORMAL
PATH REPORT.COMMENTS IMP SPEC: NORMAL
PATH REPORT.FINAL DX SPEC: NORMAL
PATH REPORT.GROSS SPEC: NORMAL
PATH REPORT.MICROSCOPIC SPEC OTHER STN: NORMAL
PATH REPORT.RELEVANT HX SPEC: NORMAL
PHOTO IMAGE: NORMAL

## 2022-05-02 DIAGNOSIS — I25.10 ATHEROSCLEROSIS OF NATIVE CORONARY ARTERY OF NATIVE HEART WITHOUT ANGINA PECTORIS: ICD-10-CM

## 2022-05-02 DIAGNOSIS — I10 ESSENTIAL HYPERTENSION: ICD-10-CM

## 2022-05-02 RX ORDER — METOPROLOL SUCCINATE 25 MG/1
50 TABLET, EXTENDED RELEASE ORAL DAILY
Qty: 180 TABLET | Refills: 1 | Status: SHIPPED | OUTPATIENT
Start: 2022-05-02 | End: 2022-10-28

## 2022-05-11 DIAGNOSIS — I10 ESSENTIAL HYPERTENSION: ICD-10-CM

## 2022-05-11 DIAGNOSIS — I25.10 ATHEROSCLEROSIS OF NATIVE CORONARY ARTERY OF NATIVE HEART WITHOUT ANGINA PECTORIS: Primary | ICD-10-CM

## 2022-05-11 RX ORDER — AMLODIPINE BESYLATE 10 MG/1
10 TABLET ORAL DAILY
Qty: 90 TABLET | Refills: 1 | Status: SHIPPED | OUTPATIENT
Start: 2022-05-11 | End: 2022-11-07

## 2022-09-18 ENCOUNTER — HEALTH MAINTENANCE LETTER (OUTPATIENT)
Age: 72
End: 2022-09-18

## 2022-10-28 DIAGNOSIS — I10 ESSENTIAL HYPERTENSION: ICD-10-CM

## 2022-10-28 DIAGNOSIS — I25.10 ATHEROSCLEROSIS OF NATIVE CORONARY ARTERY OF NATIVE HEART WITHOUT ANGINA PECTORIS: ICD-10-CM

## 2022-10-28 RX ORDER — METOPROLOL SUCCINATE 25 MG/1
TABLET, EXTENDED RELEASE ORAL
Qty: 180 TABLET | Refills: 1 | Status: SHIPPED | OUTPATIENT
Start: 2022-10-28 | End: 2023-04-28

## 2022-11-05 DIAGNOSIS — I25.10 ATHEROSCLEROSIS OF NATIVE CORONARY ARTERY OF NATIVE HEART WITHOUT ANGINA PECTORIS: ICD-10-CM

## 2022-11-05 DIAGNOSIS — I10 ESSENTIAL HYPERTENSION: ICD-10-CM

## 2022-11-07 RX ORDER — AMLODIPINE BESYLATE 10 MG/1
10 TABLET ORAL DAILY
Qty: 90 TABLET | Refills: 1 | Status: SHIPPED | OUTPATIENT
Start: 2022-11-07 | End: 2023-05-16

## 2022-12-08 ENCOUNTER — LAB REQUISITION (OUTPATIENT)
Dept: LAB | Facility: CLINIC | Age: 72
End: 2022-12-08
Payer: COMMERCIAL

## 2022-12-08 ENCOUNTER — TRANSFERRED RECORDS (OUTPATIENT)
Dept: HEALTH INFORMATION MANAGEMENT | Facility: CLINIC | Age: 72
End: 2022-12-08

## 2022-12-08 ENCOUNTER — LAB REQUISITION (OUTPATIENT)
Dept: LAB | Facility: CLINIC | Age: 72
End: 2022-12-08

## 2022-12-08 DIAGNOSIS — Z01.818 ENCOUNTER FOR OTHER PREPROCEDURAL EXAMINATION: ICD-10-CM

## 2022-12-08 DIAGNOSIS — I10 ESSENTIAL (PRIMARY) HYPERTENSION: ICD-10-CM

## 2022-12-08 LAB
ALBUMIN SERPL BCG-MCNC: 4.4 G/DL (ref 3.5–5.2)
ALP SERPL-CCNC: 122 U/L (ref 40–129)
ALT SERPL W P-5'-P-CCNC: 23 U/L (ref 10–50)
ANION GAP SERPL CALCULATED.3IONS-SCNC: 13 MMOL/L (ref 7–15)
AST SERPL W P-5'-P-CCNC: 25 U/L (ref 10–50)
BILIRUB SERPL-MCNC: 1.7 MG/DL
BUN SERPL-MCNC: 18.2 MG/DL (ref 8–23)
CALCIUM SERPL-MCNC: 9.8 MG/DL (ref 8.8–10.2)
CHLORIDE SERPL-SCNC: 102 MMOL/L (ref 98–107)
CREAT SERPL-MCNC: 1.14 MG/DL (ref 0.67–1.17)
DEPRECATED HCO3 PLAS-SCNC: 23 MMOL/L (ref 22–29)
GFR SERPL CREATININE-BSD FRML MDRD: 68 ML/MIN/1.73M2
GLUCOSE SERPL-MCNC: 109 MG/DL (ref 70–99)
POTASSIUM SERPL-SCNC: 4.2 MMOL/L (ref 3.4–5.3)
PROT SERPL-MCNC: 7 G/DL (ref 6.4–8.3)
SARS-COV-2 RNA RESP QL NAA+PROBE: NEGATIVE
SODIUM SERPL-SCNC: 138 MMOL/L (ref 136–145)

## 2022-12-08 PROCEDURE — U0005 INFEC AGEN DETEC AMPLI PROBE: HCPCS | Mod: ORL | Performed by: NURSE PRACTITIONER

## 2022-12-08 PROCEDURE — 82040 ASSAY OF SERUM ALBUMIN: CPT | Performed by: NURSE PRACTITIONER

## 2022-12-08 PROCEDURE — 80053 COMPREHEN METABOLIC PANEL: CPT | Performed by: NURSE PRACTITIONER

## 2022-12-23 ENCOUNTER — HOSPITAL ENCOUNTER (OUTPATIENT)
Dept: ULTRASOUND IMAGING | Facility: HOSPITAL | Age: 72
Discharge: HOME | End: 2022-12-23
Attending: PHYSICIAN ASSISTANT | Admitting: PHYSICIAN ASSISTANT
Payer: COMMERCIAL

## 2022-12-23 ENCOUNTER — APPOINTMENT (OUTPATIENT)
Dept: LAB | Facility: CLINIC | Age: 72
End: 2022-12-23
Payer: COMMERCIAL

## 2022-12-23 ENCOUNTER — MEDICAL CORRESPONDENCE (OUTPATIENT)
Dept: HEALTH INFORMATION MANAGEMENT | Facility: CLINIC | Age: 72
End: 2022-12-23

## 2022-12-23 DIAGNOSIS — M79.669 CALF PAIN: ICD-10-CM

## 2022-12-23 DIAGNOSIS — Z09 RADIOTHERAPY FOLLOW-UP EXAMINATION: Primary | ICD-10-CM

## 2022-12-23 DIAGNOSIS — Z09 POSTOPERATIVE FOLLOW-UP: ICD-10-CM

## 2022-12-23 DIAGNOSIS — M79.89 LEG SWELLING: ICD-10-CM

## 2022-12-23 LAB
BASOPHILS # BLD AUTO: 0 10E3/UL (ref 0–0.2)
BASOPHILS NFR BLD AUTO: 0 %
CRP SERPL-MCNC: <3 MG/L
EOSINOPHIL # BLD AUTO: 0.2 10E3/UL (ref 0–0.7)
EOSINOPHIL NFR BLD AUTO: 2 %
ERYTHROCYTE [DISTWIDTH] IN BLOOD BY AUTOMATED COUNT: 12 % (ref 10–15)
ERYTHROCYTE [SEDIMENTATION RATE] IN BLOOD BY WESTERGREN METHOD: 13 MM/HR (ref 0–15)
HCT VFR BLD AUTO: 43.1 % (ref 40–53)
HGB BLD-MCNC: 14.4 G/DL (ref 13.3–17.7)
IMM GRANULOCYTES # BLD: 0 10E3/UL
IMM GRANULOCYTES NFR BLD: 0 %
LYMPHOCYTES # BLD AUTO: 1.4 10E3/UL (ref 0.8–5.3)
LYMPHOCYTES NFR BLD AUTO: 16 %
MCH RBC QN AUTO: 29.2 PG (ref 26.5–33)
MCHC RBC AUTO-ENTMCNC: 33.4 G/DL (ref 31.5–36.5)
MCV RBC AUTO: 87 FL (ref 78–100)
MONOCYTES # BLD AUTO: 0.6 10E3/UL (ref 0–1.3)
MONOCYTES NFR BLD AUTO: 7 %
NEUTROPHILS # BLD AUTO: 6.5 10E3/UL (ref 1.6–8.3)
NEUTROPHILS NFR BLD AUTO: 74 %
PLATELET # BLD AUTO: 213 10E3/UL (ref 150–450)
RBC # BLD AUTO: 4.93 10E6/UL (ref 4.4–5.9)
URATE SERPL-MCNC: 5.1 MG/DL (ref 3.4–7)
WBC # BLD AUTO: 8.7 10E3/UL (ref 4–11)

## 2022-12-23 PROCEDURE — 86140 C-REACTIVE PROTEIN: CPT | Performed by: PHYSICIAN ASSISTANT

## 2022-12-23 PROCEDURE — 85652 RBC SED RATE AUTOMATED: CPT | Performed by: PHYSICIAN ASSISTANT

## 2022-12-23 PROCEDURE — 86431 RHEUMATOID FACTOR QUANT: CPT | Performed by: PHYSICIAN ASSISTANT

## 2022-12-23 PROCEDURE — 86200 CCP ANTIBODY: CPT | Performed by: PHYSICIAN ASSISTANT

## 2022-12-23 PROCEDURE — 93971 EXTREMITY STUDY: CPT | Mod: RT

## 2022-12-23 PROCEDURE — 85025 COMPLETE CBC W/AUTO DIFF WBC: CPT | Performed by: PHYSICIAN ASSISTANT

## 2022-12-23 PROCEDURE — 36415 COLL VENOUS BLD VENIPUNCTURE: CPT | Performed by: PHYSICIAN ASSISTANT

## 2022-12-23 PROCEDURE — 84550 ASSAY OF BLOOD/URIC ACID: CPT | Performed by: PHYSICIAN ASSISTANT

## 2022-12-23 PROCEDURE — 86038 ANTINUCLEAR ANTIBODIES: CPT | Performed by: PHYSICIAN ASSISTANT

## 2022-12-26 LAB
ANA PAT SER IF-IMP: ABNORMAL
ANA SER QL IF: POSITIVE
ANA TITR SER IF: ABNORMAL {TITER}
CCP AB SER IA-ACNC: 0.9 U/ML
RHEUMATOID FACT SER NEPH-ACNC: <6 IU/ML

## 2023-04-07 ENCOUNTER — OFFICE VISIT (OUTPATIENT)
Dept: CARDIOLOGY | Facility: CLINIC | Age: 73
End: 2023-04-07
Payer: COMMERCIAL

## 2023-04-07 VITALS
WEIGHT: 245.8 LBS | HEIGHT: 67 IN | RESPIRATION RATE: 16 BRPM | BODY MASS INDEX: 38.58 KG/M2 | DIASTOLIC BLOOD PRESSURE: 60 MMHG | SYSTOLIC BLOOD PRESSURE: 142 MMHG | HEART RATE: 76 BPM

## 2023-04-07 DIAGNOSIS — I10 ESSENTIAL HYPERTENSION: ICD-10-CM

## 2023-04-07 DIAGNOSIS — I25.10 ATHEROSCLEROSIS OF NATIVE CORONARY ARTERY OF NATIVE HEART WITHOUT ANGINA PECTORIS: Primary | ICD-10-CM

## 2023-04-07 DIAGNOSIS — Z95.2 S/P TAVR (TRANSCATHETER AORTIC VALVE REPLACEMENT): ICD-10-CM

## 2023-04-07 PROCEDURE — 99214 OFFICE O/P EST MOD 30 MIN: CPT | Performed by: INTERNAL MEDICINE

## 2023-04-07 RX ORDER — MELOXICAM 15 MG/1
15 TABLET ORAL PRN
COMMUNITY
Start: 2023-03-02

## 2023-04-07 RX ORDER — LOSARTAN POTASSIUM 100 MG/1
1 TABLET ORAL
COMMUNITY
Start: 2023-02-20

## 2023-04-07 NOTE — LETTER
4/7/2023    Yonathan Bell MD  [address]  [place] [address]    RE: Chandler Eldridge       Dear Colleague,     I had the pleasure of seeing Chandler Eldridge in the [facility].      [facility]  [phone]      Assessment/Recommendations   Patient with known aortic stenosis, status post transcutaneous aortic valve replacement and moderate coronary artery disease with a 60% right coronary artery lesion.  He has been doing well and mostly struggling with orthopedic issues.  He is starting to exercise more and is not having any anginal symptoms or breathing issues.  Blood pressure at home has been consistently less than 140.  LDL cholesterol was a little bit higher on last check but his activity levels were down and he is starting to  his activity now which will help both his blood pressure and his LDL cholesterol.    Have not change any of his medications today.  Encouraged him to maintain an active lifestyle and continue to exercise which I know he will do.  We will plan on seeing him back in 1 year, but of course would be happy to see him sooner if questions or problems arise.  Echocardiogram 1 year ago was unremarkable.  We will likely recheck an echocardiogram next year.    Thank you for allowing us to participate in his care.    30 minutes spent with chart review, patient visit, and documentation.       History of Present Illness/Subjective    Mr. Chandler Eldridge is a 73 year old male with known coronary artery disease with moderate 60% stenosis in his right coronary artery and other mild coronary artery disease.  He also had severe aortic stenosis and had transcutaneous aortic valve replacement.  The valve has been functioning well on prior echocardiograms.  His most recent issues have been more orthopedic in nature.  He has had difficulty with his right knee and had surgery last year which kept him from exercising for a while but now is  "starting to  his walking and exercising.  He denies any chest discomfort, unusual shortness of breath with activity, orthopnea, paroxysmal nocturnal dyspnea and does get some peripheral edema.  This seems to be getting better.  No syncopal or near syncopal episodes.  He does sometimes feel little lightheaded when he is stooping over and comes up quickly.    Blood pressures at home of been running in the 130s consistently.  He believes he has an element of whitecoat hypertension.         Physical Examination Review of Systems   BP (!) 142/60 (BP Location: Right arm, Patient Position: Sitting, Cuff Size: Adult Large)   Pulse 76   Resp 16   Ht 1.702 m (5' 7\")   Wt 111.5 kg (245 lb 12.8 oz)   BMI 38.50 kg/m    Body mass index is 38.5 kg/m .  Wt Readings from Last 3 Encounters:   04/07/23 111.5 kg (245 lb 12.8 oz)   03/17/22 109.9 kg (242 lb 4.8 oz)   05/11/21 104.3 kg (230 lb)     General Appearance:   Alert, cooperative and in no acute distress.   ENT/Mouth: Patient wearing a mask.      EYES:  no scleral icterus, normal conjunctivae   Neck: JVP normal. No Hepatojugular reflux. Thyroid not visualized.   Chest/Lungs:   Lungs are clear to auscultation, equal chest wall expansion.   Cardiovascular:   S1, S2 with 1/6 systolic murmur , no clicks or rubs. Brachial, radial  pulses are intact and symetric.  Left posterior tibial is mildly diminished when compared to the right.  No carotid bruits noted   Abdomen:  Nontender. BS+.  Rounded   Extremities: No cyanosis, clubbing and trace pretibial edema   Skin: no xanthelasma, warm.    Neurologic: normal arm movement bilateral, no tremors     Psychiatric: Appropriate affect.      Enc Vitals  BP: (!) 142/60  Pulse: 76  Resp: 16  Weight: 111.5 kg (245 lb 12.8 oz)  Height: 170.2 cm (5' 7\")                                           Medical History  Surgical History Family History Social History   Past Medical History:   Diagnosis Date    Aortic stenosis     Arthritis     " Coronary artery disease     Family history of myocardial infarction     mom dad    GERD (gastroesophageal reflux disease)     Hyperlipidemia     Hypertension     PONV (postoperative nausea and vomiting)     SOB (shortness of breath)     Past Surgical History:   Procedure Laterality Date    CARDIAC CATHETERIZATION      CHOLECYSTECTOMY  2017    CV CORONARY ANGIOGRAM N/A 1/22/2020    Procedure: Coronary Angiogram;  Surgeon: Jamar Almanza MD;  Location: [facility];  Service: Cardiology    CV LEFT HEART CATHETERIZATION WITHOUT LEFT VENTRICULOGRAM Left 1/22/2020    Procedure: Left Heart Catheterization Without Left Ventriculogram;  Surgeon: Jamar Almanza MD;  Location: [facility];  Service: Cardiology    CV RIGHT HEART CATHETERIZATION N/A 1/22/2020    Procedure: Right Heart Catheterization;  Surgeon: Jamar Almanza MD;  Location: [facility];  Service: Cardiology    CV TRANSCATHETER AORTIC VAVLE REPLACEMENT - FEMORAL APPROACH N/A 3/17/2020    Procedure: Transfemoral Transcatheter Aortic Valve Replacement;  Surgeon: Jamar Almanza MD;  Location: [facility];  Service: Cardiology    ENDOSCOPIC RELEASE ULNAR NERVE (ELBOW) Left 10/5/2018    Procedure: ENDOSCOPIC RELEASE ULNAR NERVE (ELBOW);  Left Arm Endoscopic Cubital Tunnel Release;  Surgeon: Gene Garcia MD;  Location: [facility]    FOOT SURGERY Bilateral     HERNIA REPAIR  2002    BIHR done by Dr. Schmidt    LUMBAR SPINE SURGERY  2004    Decompression    SPINAL FUSION Bilateral 2009    TOTAL SHOULDER REPLACEMENT Left 11/2018    Z TOTAL HIP ARTHROPLASTY Left 5/11/2021    Procedure: LEFT TOTAL HIP ARTHROPLASTY;  Surgeon: Gene Corea MD;  Location: [facility];  Service: Orthopedics    Family History   Problem Relation Age of Onset    Coronary Artery Disease Mother     Coronary Artery Disease Father     Social History     Socioeconomic History    Marital status:      Spouse name: Not  on file    Number of children: Not on file    Years of education: Not on file    Highest education level: Not on file   Occupational History    Not on file   Tobacco Use    Smoking status: Never    Smokeless tobacco: Never   Vaping Use    Vaping status: Never Used   Substance and Sexual Activity    Alcohol use: Not Currently    Drug use: No    Sexual activity: Yes   Other Topics Concern    Not on file   Social History Narrative    Not on file     Social Determinants of Health     Financial Resource Strain: Not on file   Food Insecurity: Not on file   Transportation Needs: Not on file   Physical Activity: Not on file   Stress: Not on file   Social Connections: Not on file   Intimate Partner Violence: Not on file   Housing Stability: Not on file          Medications  Allergies   Current Outpatient Medications   Medication Sig Dispense Refill    amLODIPine (NORVASC) 10 MG tablet TAKE 1 TABLET (10 MG) BY MOUTH DAILY. 90 tablet 1    aspirin 81 MG EC tablet Take 81 mg by mouth daily      atorvastatin (LIPITOR) 80 MG tablet Take 80 mg by mouth daily      losartan (COZAAR) 100 MG tablet Take 1 tablet by mouth daily at 2 pm      meloxicam (MOBIC) 15 MG tablet Take 15 mg by mouth daily with food      metoprolol succinate ER (TOPROL XL) 25 MG 24 hr tablet TAKE 2 TABLETS BY MOUTH EVERY  tablet 1    Allergies   Allergen Reactions    Vancomycin Anxiety, Difficulty breathing, Hives, Itching, Other (See Comments), Palpitations and Rash     Other reaction(s): rash/hypertension/  Patient expresses that he will not use vancomycin again- d/t rash/itch/increased BP (has not tried premedication to tolerate.) He has tolerated clindamycin oral in the past.      Anesthesia S-I-40 [Kdc:Egg Phospholipids+Sodium Metabisulfite+Soybean Oil+Propofol]      Other reaction(s): nausea    Niacin Other (See Comments)     Other reaction(s): flushing 11/02  flushing  Flushing      Oxycodone-Acetaminophen      Other reaction(s):  Gastrointestinal  N/V    Pcn [Penicillins] Rash         Lab Results    Chemistry/lipid CBC Cardiac Enzymes/BNP/TSH/INR   Lab Results   Component Value Date    CHOL 162 04/15/2022    HDL 42 04/15/2022    TRIG 128 04/15/2022    BUN 18.2 12/08/2022     12/08/2022    CO2 23 12/08/2022    Lab Results   Component Value Date    WBC 8.7 12/23/2022    HGB 14.4 12/23/2022    HCT 43.1 12/23/2022    MCV 87 12/23/2022     12/23/2022    Lab Results   Component Value Date    INR 1.05 05/11/2021                  Thank you for allowing me to participate in the care of your patient.      Sincerely,     Tico Andre MD     [facility]  cc:   Tico Andre MD  [address]  [place] [address]

## 2023-04-07 NOTE — PROGRESS NOTES
[facility]  [phone]      Assessment/Recommendations   Patient with known aortic stenosis, status post transcutaneous aortic valve replacement and moderate coronary artery disease with a 60% right coronary artery lesion.  He has been doing well and mostly struggling with orthopedic issues.  He is starting to exercise more and is not having any anginal symptoms or breathing issues.  Blood pressure at home has been consistently less than 140.  LDL cholesterol was a little bit higher on last check but his activity levels were down and he is starting to  his activity now which will help both his blood pressure and his LDL cholesterol.    Have not change any of his medications today.  Encouraged him to maintain an active lifestyle and continue to exercise which I know he will do.  We will plan on seeing him back in 1 year, but of course would be happy to see him sooner if questions or problems arise.  Echocardiogram 1 year ago was unremarkable.  We will likely recheck an echocardiogram next year.    Thank you for allowing us to participate in his care.    30 minutes spent with chart review, patient visit, and documentation.       History of Present Illness/Subjective    Mr. Chandler Eldridge is a 73 year old male with known coronary artery disease with moderate 60% stenosis in his right coronary artery and other mild coronary artery disease.  He also had severe aortic stenosis and had transcutaneous aortic valve replacement.  The valve has been functioning well on prior echocardiograms.  His most recent issues have been more orthopedic in nature.  He has had difficulty with his right knee and had surgery last year which kept him from exercising for a while but now is starting to  his walking and exercising.  He denies any chest discomfort, unusual shortness of breath with activity, orthopnea, paroxysmal nocturnal dyspnea and does get some peripheral edema.  This seems to be getting  "better.  No syncopal or near syncopal episodes.  He does sometimes feel little lightheaded when he is stooping over and comes up quickly.    Blood pressures at home of been running in the 130s consistently.  He believes he has an element of whitecoat hypertension.         Physical Examination Review of Systems   BP (!) 142/60 (BP Location: Right arm, Patient Position: Sitting, Cuff Size: Adult Large)   Pulse 76   Resp 16   Ht 1.702 m (5' 7\")   Wt 111.5 kg (245 lb 12.8 oz)   BMI 38.50 kg/m    Body mass index is 38.5 kg/m .  Wt Readings from Last 3 Encounters:   04/07/23 111.5 kg (245 lb 12.8 oz)   03/17/22 109.9 kg (242 lb 4.8 oz)   05/11/21 104.3 kg (230 lb)     General Appearance:   Alert, cooperative and in no acute distress.   ENT/Mouth: Patient wearing a mask.      EYES:  no scleral icterus, normal conjunctivae   Neck: JVP normal. No Hepatojugular reflux. Thyroid not visualized.   Chest/Lungs:   Lungs are clear to auscultation, equal chest wall expansion.   Cardiovascular:   S1, S2 with 1/6 systolic murmur , no clicks or rubs. Brachial, radial  pulses are intact and symetric.  Left posterior tibial is mildly diminished when compared to the right.  No carotid bruits noted   Abdomen:  Nontender. BS+.  Rounded   Extremities: No cyanosis, clubbing and trace pretibial edema   Skin: no xanthelasma, warm.    Neurologic: normal arm movement bilateral, no tremors     Psychiatric: Appropriate affect.      Enc Vitals  BP: (!) 142/60  Pulse: 76  Resp: 16  Weight: 111.5 kg (245 lb 12.8 oz)  Height: 170.2 cm (5' 7\")                                           Medical History  Surgical History Family History Social History   Past Medical History:   Diagnosis Date     Aortic stenosis      Arthritis      Coronary artery disease      Family history of myocardial infarction     mom dad     GERD (gastroesophageal reflux disease)      Hyperlipidemia      Hypertension      PONV (postoperative nausea and vomiting)      SOB " (shortness of breath)     Past Surgical History:   Procedure Laterality Date     CARDIAC CATHETERIZATION       CHOLECYSTECTOMY  2017     CV CORONARY ANGIOGRAM N/A 1/22/2020    Procedure: Coronary Angiogram;  Surgeon: Jamar Almanza MD;  Location: [facility];  Service: Cardiology     CV LEFT HEART CATHETERIZATION WITHOUT LEFT VENTRICULOGRAM Left 1/22/2020    Procedure: Left Heart Catheterization Without Left Ventriculogram;  Surgeon: Jamar Almanza MD;  Location: [facility];  Service: Cardiology     CV RIGHT HEART CATHETERIZATION N/A 1/22/2020    Procedure: Right Heart Catheterization;  Surgeon: Jamar Almanza MD;  Location: [facility];  Service: Cardiology     CV TRANSCATHETER AORTIC VAVLE REPLACEMENT - FEMORAL APPROACH N/A 3/17/2020    Procedure: Transfemoral Transcatheter Aortic Valve Replacement;  Surgeon: Jamar Almanza MD;  Location: [facility];  Service: Cardiology     ENDOSCOPIC RELEASE ULNAR NERVE (ELBOW) Left 10/5/2018    Procedure: ENDOSCOPIC RELEASE ULNAR NERVE (ELBOW);  Left Arm Endoscopic Cubital Tunnel Release;  Surgeon: Gene Garcia MD;  Location: [facility]     FOOT SURGERY Bilateral      HERNIA REPAIR  2002    BIHR done by Dr. Schmidt     LUMBAR SPINE SURGERY  2004    Decompression     SPINAL FUSION Bilateral 2009     TOTAL SHOULDER REPLACEMENT Left 11/2018     ZZC TOTAL HIP ARTHROPLASTY Left 5/11/2021    Procedure: LEFT TOTAL HIP ARTHROPLASTY;  Surgeon: Gene Corea MD;  Location: [facility];  Service: Orthopedics    Family History   Problem Relation Age of Onset     Coronary Artery Disease Mother      Coronary Artery Disease Father     Social History     Socioeconomic History     Marital status:      Spouse name: Not on file     Number of children: Not on file     Years of education: Not on file     Highest education level: Not on file   Occupational History     Not on file   Tobacco Use     Smoking status:  Never     Smokeless tobacco: Never   Vaping Use     Vaping status: Never Used   Substance and Sexual Activity     Alcohol use: Not Currently     Drug use: No     Sexual activity: Yes   Other Topics Concern     Not on file   Social History Narrative     Not on file     Social Determinants of Health     Financial Resource Strain: Not on file   Food Insecurity: Not on file   Transportation Needs: Not on file   Physical Activity: Not on file   Stress: Not on file   Social Connections: Not on file   Intimate Partner Violence: Not on file   Housing Stability: Not on file          Medications  Allergies   Current Outpatient Medications   Medication Sig Dispense Refill     amLODIPine (NORVASC) 10 MG tablet TAKE 1 TABLET (10 MG) BY MOUTH DAILY. 90 tablet 1     aspirin 81 MG EC tablet Take 81 mg by mouth daily       atorvastatin (LIPITOR) 80 MG tablet Take 80 mg by mouth daily       losartan (COZAAR) 100 MG tablet Take 1 tablet by mouth daily at 2 pm       meloxicam (MOBIC) 15 MG tablet Take 15 mg by mouth daily with food       metoprolol succinate ER (TOPROL XL) 25 MG 24 hr tablet TAKE 2 TABLETS BY MOUTH EVERY  tablet 1    Allergies   Allergen Reactions     Vancomycin Anxiety, Difficulty breathing, Hives, Itching, Other (See Comments), Palpitations and Rash     Other reaction(s): rash/hypertension/  Patient expresses that he will not use vancomycin again- d/t rash/itch/increased BP (has not tried premedication to tolerate.) He has tolerated clindamycin oral in the past.       Anesthesia S-I-40 [Kdc:Egg Phospholipids+Sodium Metabisulfite+Soybean Oil+Propofol]      Other reaction(s): nausea     Niacin Other (See Comments)     Other reaction(s): flushing 11/02  flushing  Flushing       Oxycodone-Acetaminophen      Other reaction(s): Gastrointestinal  N/V     Pcn [Penicillins] Rash         Lab Results    Chemistry/lipid CBC Cardiac Enzymes/BNP/TSH/INR   Lab Results   Component Value Date    CHOL 162 04/15/2022    HDL 42  04/15/2022    TRIG 128 04/15/2022    BUN 18.2 12/08/2022     12/08/2022    CO2 23 12/08/2022    Lab Results   Component Value Date    WBC 8.7 12/23/2022    HGB 14.4 12/23/2022    HCT 43.1 12/23/2022    MCV 87 12/23/2022     12/23/2022    Lab Results   Component Value Date    INR 1.05 05/11/2021

## 2023-04-19 ENCOUNTER — LAB REQUISITION (OUTPATIENT)
Dept: LAB | Facility: CLINIC | Age: 73
End: 2023-04-19

## 2023-04-19 DIAGNOSIS — I11.9 HYPERTENSIVE HEART DISEASE WITHOUT HEART FAILURE: ICD-10-CM

## 2023-04-19 DIAGNOSIS — I10 ESSENTIAL (PRIMARY) HYPERTENSION: ICD-10-CM

## 2023-04-19 DIAGNOSIS — I25.10 ATHEROSCLEROTIC HEART DISEASE OF NATIVE CORONARY ARTERY WITHOUT ANGINA PECTORIS: ICD-10-CM

## 2023-04-19 DIAGNOSIS — E78.5 HYPERLIPIDEMIA, UNSPECIFIED: ICD-10-CM

## 2023-04-19 LAB
ALBUMIN SERPL BCG-MCNC: 4.4 G/DL (ref 3.5–5.2)
ALP SERPL-CCNC: 115 U/L (ref 40–129)
ALT SERPL W P-5'-P-CCNC: 20 U/L (ref 10–50)
ANION GAP SERPL CALCULATED.3IONS-SCNC: 12 MMOL/L (ref 7–15)
AST SERPL W P-5'-P-CCNC: 22 U/L (ref 10–50)
BILIRUB SERPL-MCNC: 1.6 MG/DL
BUN SERPL-MCNC: 19.2 MG/DL (ref 8–23)
CALCIUM SERPL-MCNC: 9.6 MG/DL (ref 8.8–10.2)
CHLORIDE SERPL-SCNC: 102 MMOL/L (ref 98–107)
CHOLEST SERPL-MCNC: 132 MG/DL
CREAT SERPL-MCNC: 1.21 MG/DL (ref 0.67–1.17)
DEPRECATED HCO3 PLAS-SCNC: 24 MMOL/L (ref 22–29)
GFR SERPL CREATININE-BSD FRML MDRD: 63 ML/MIN/1.73M2
GLUCOSE SERPL-MCNC: 115 MG/DL (ref 70–99)
HDLC SERPL-MCNC: 41 MG/DL
LDLC SERPL CALC-MCNC: 68 MG/DL
NONHDLC SERPL-MCNC: 91 MG/DL
POTASSIUM SERPL-SCNC: 4.3 MMOL/L (ref 3.4–5.3)
PROT SERPL-MCNC: 7.1 G/DL (ref 6.4–8.3)
PSA SERPL DL<=0.01 NG/ML-MCNC: 1.11 NG/ML (ref 0–6.5)
SODIUM SERPL-SCNC: 138 MMOL/L (ref 136–145)
TRIGL SERPL-MCNC: 116 MG/DL

## 2023-04-19 PROCEDURE — G0103 PSA SCREENING: HCPCS | Performed by: STUDENT IN AN ORGANIZED HEALTH CARE EDUCATION/TRAINING PROGRAM

## 2023-04-19 PROCEDURE — 80053 COMPREHEN METABOLIC PANEL: CPT | Performed by: STUDENT IN AN ORGANIZED HEALTH CARE EDUCATION/TRAINING PROGRAM

## 2023-04-19 PROCEDURE — 80061 LIPID PANEL: CPT | Performed by: STUDENT IN AN ORGANIZED HEALTH CARE EDUCATION/TRAINING PROGRAM

## 2023-04-28 DIAGNOSIS — I25.10 ATHEROSCLEROSIS OF NATIVE CORONARY ARTERY OF NATIVE HEART WITHOUT ANGINA PECTORIS: ICD-10-CM

## 2023-04-28 DIAGNOSIS — I10 ESSENTIAL HYPERTENSION: ICD-10-CM

## 2023-04-28 RX ORDER — METOPROLOL SUCCINATE 25 MG/1
TABLET, EXTENDED RELEASE ORAL
Qty: 180 TABLET | Refills: 3 | Status: SHIPPED | OUTPATIENT
Start: 2023-04-28 | End: 2024-05-13

## 2023-05-13 DIAGNOSIS — I25.10 ATHEROSCLEROSIS OF NATIVE CORONARY ARTERY OF NATIVE HEART WITHOUT ANGINA PECTORIS: ICD-10-CM

## 2023-05-13 DIAGNOSIS — I10 ESSENTIAL HYPERTENSION: ICD-10-CM

## 2023-05-16 RX ORDER — AMLODIPINE BESYLATE 10 MG/1
10 TABLET ORAL DAILY
Qty: 90 TABLET | Refills: 3 | Status: SHIPPED | OUTPATIENT
Start: 2023-05-16 | End: 2024-05-13

## 2023-10-08 ENCOUNTER — HEALTH MAINTENANCE LETTER (OUTPATIENT)
Age: 73
End: 2023-10-08

## 2024-03-28 ENCOUNTER — MYC MEDICAL ADVICE (OUTPATIENT)
Dept: CARDIOLOGY | Facility: CLINIC | Age: 74
End: 2024-03-28
Payer: COMMERCIAL

## 2024-03-28 DIAGNOSIS — I10 ESSENTIAL HYPERTENSION: Primary | ICD-10-CM

## 2024-03-28 DIAGNOSIS — Z95.2 S/P TAVR (TRANSCATHETER AORTIC VALVE REPLACEMENT): ICD-10-CM

## 2024-03-28 DIAGNOSIS — I35.9 AORTIC VALVE DISORDER: ICD-10-CM

## 2024-03-28 DIAGNOSIS — I25.10 CORONARY ATHEROSCLEROSIS: ICD-10-CM

## 2024-03-29 NOTE — TELEPHONE ENCOUNTER
From: Tico Andre MD  Sent: 3/29/2024   7:22 AM CDT  To: Pam Madison; Shaniqua Murray  Subject: FW: do I need a heart echo or at least an ap*    Could we get an echocardiogram and a follow-up visit?    ThanksTico

## 2024-04-12 ENCOUNTER — MYC MEDICAL ADVICE (OUTPATIENT)
Dept: CARDIOLOGY | Facility: CLINIC | Age: 74
End: 2024-04-12
Payer: COMMERCIAL

## 2024-04-16 ENCOUNTER — TRANSFERRED RECORDS (OUTPATIENT)
Dept: HEALTH INFORMATION MANAGEMENT | Facility: CLINIC | Age: 74
End: 2024-04-16

## 2024-04-29 ENCOUNTER — HOSPITAL ENCOUNTER (OUTPATIENT)
Dept: CARDIOLOGY | Facility: CLINIC | Age: 74
Discharge: HOME | End: 2024-04-29
Attending: INTERNAL MEDICINE | Admitting: INTERNAL MEDICINE
Payer: COMMERCIAL

## 2024-04-29 DIAGNOSIS — I35.9 AORTIC VALVE DISORDER: ICD-10-CM

## 2024-04-29 DIAGNOSIS — I25.10 CORONARY ATHEROSCLEROSIS: ICD-10-CM

## 2024-04-29 DIAGNOSIS — I10 ESSENTIAL HYPERTENSION: ICD-10-CM

## 2024-04-29 DIAGNOSIS — Z95.2 S/P TAVR (TRANSCATHETER AORTIC VALVE REPLACEMENT): ICD-10-CM

## 2024-04-29 LAB — LVEF ECHO: NORMAL

## 2024-04-29 PROCEDURE — 93306 TTE W/DOPPLER COMPLETE: CPT | Mod: 26 | Performed by: STUDENT IN AN ORGANIZED HEALTH CARE EDUCATION/TRAINING PROGRAM

## 2024-04-29 PROCEDURE — 93306 TTE W/DOPPLER COMPLETE: CPT

## 2024-05-01 ENCOUNTER — TRANSFERRED RECORDS (OUTPATIENT)
Dept: HEALTH INFORMATION MANAGEMENT | Facility: CLINIC | Age: 74
End: 2024-05-01
Payer: COMMERCIAL

## 2024-05-01 ENCOUNTER — LAB REQUISITION (OUTPATIENT)
Dept: LAB | Facility: CLINIC | Age: 74
End: 2024-05-01

## 2024-05-01 DIAGNOSIS — I25.10 ATHEROSCLEROTIC HEART DISEASE OF NATIVE CORONARY ARTERY WITHOUT ANGINA PECTORIS: ICD-10-CM

## 2024-05-01 DIAGNOSIS — Z12.5 ENCOUNTER FOR SCREENING FOR MALIGNANT NEOPLASM OF PROSTATE: ICD-10-CM

## 2024-05-01 DIAGNOSIS — I10 ESSENTIAL (PRIMARY) HYPERTENSION: ICD-10-CM

## 2024-05-01 PROCEDURE — G0103 PSA SCREENING: HCPCS | Performed by: STUDENT IN AN ORGANIZED HEALTH CARE EDUCATION/TRAINING PROGRAM

## 2024-05-01 PROCEDURE — 80053 COMPREHEN METABOLIC PANEL: CPT | Performed by: STUDENT IN AN ORGANIZED HEALTH CARE EDUCATION/TRAINING PROGRAM

## 2024-05-01 PROCEDURE — 80061 LIPID PANEL: CPT | Performed by: STUDENT IN AN ORGANIZED HEALTH CARE EDUCATION/TRAINING PROGRAM

## 2024-05-02 LAB
ALBUMIN SERPL BCG-MCNC: 4.4 G/DL (ref 3.5–5.2)
ALP SERPL-CCNC: 120 U/L (ref 40–150)
ALT SERPL W P-5'-P-CCNC: 25 U/L (ref 0–70)
ANION GAP SERPL CALCULATED.3IONS-SCNC: 10 MMOL/L (ref 7–15)
AST SERPL W P-5'-P-CCNC: 25 U/L (ref 0–45)
BILIRUB SERPL-MCNC: 1.1 MG/DL
BUN SERPL-MCNC: 14.8 MG/DL (ref 8–23)
CALCIUM SERPL-MCNC: 10.1 MG/DL (ref 8.8–10.2)
CHLORIDE SERPL-SCNC: 102 MMOL/L (ref 98–107)
CHOLEST SERPL-MCNC: 143 MG/DL
CREAT SERPL-MCNC: 1.13 MG/DL (ref 0.67–1.17)
DEPRECATED HCO3 PLAS-SCNC: 26 MMOL/L (ref 22–29)
EGFRCR SERPLBLD CKD-EPI 2021: 68 ML/MIN/1.73M2
FASTING STATUS PATIENT QL REPORTED: ABNORMAL
GLUCOSE SERPL-MCNC: 108 MG/DL (ref 70–99)
HDLC SERPL-MCNC: 39 MG/DL
LDLC SERPL CALC-MCNC: 55 MG/DL
NONHDLC SERPL-MCNC: 104 MG/DL
POTASSIUM SERPL-SCNC: 4.9 MMOL/L (ref 3.4–5.3)
PROT SERPL-MCNC: 7.5 G/DL (ref 6.4–8.3)
PSA SERPL DL<=0.01 NG/ML-MCNC: 1.58 NG/ML (ref 0–6.5)
SODIUM SERPL-SCNC: 138 MMOL/L (ref 135–145)
TRIGL SERPL-MCNC: 247 MG/DL

## 2024-05-13 DIAGNOSIS — I25.10 ATHEROSCLEROSIS OF NATIVE CORONARY ARTERY OF NATIVE HEART WITHOUT ANGINA PECTORIS: ICD-10-CM

## 2024-05-13 DIAGNOSIS — I10 ESSENTIAL HYPERTENSION: ICD-10-CM

## 2024-05-13 RX ORDER — METOPROLOL SUCCINATE 25 MG/1
TABLET, EXTENDED RELEASE ORAL
Qty: 180 TABLET | Refills: 1 | Status: SHIPPED | OUTPATIENT
Start: 2024-05-13 | End: 2024-05-31 | Stop reason: ALTCHOICE

## 2024-05-13 RX ORDER — AMLODIPINE BESYLATE 10 MG/1
10 TABLET ORAL DAILY
Qty: 90 TABLET | Refills: 1 | Status: SHIPPED | OUTPATIENT
Start: 2024-05-13

## 2024-05-31 ENCOUNTER — OFFICE VISIT (OUTPATIENT)
Dept: CARDIOLOGY | Facility: CLINIC | Age: 74
End: 2024-05-31
Payer: COMMERCIAL

## 2024-05-31 VITALS
RESPIRATION RATE: 20 BRPM | OXYGEN SATURATION: 97 % | WEIGHT: 245.8 LBS | SYSTOLIC BLOOD PRESSURE: 140 MMHG | DIASTOLIC BLOOD PRESSURE: 66 MMHG | HEART RATE: 54 BPM | BODY MASS INDEX: 38.5 KG/M2

## 2024-05-31 DIAGNOSIS — Z95.2 S/P TAVR (TRANSCATHETER AORTIC VALVE REPLACEMENT): Primary | ICD-10-CM

## 2024-05-31 DIAGNOSIS — E66.01 MORBID OBESITY (H): ICD-10-CM

## 2024-05-31 DIAGNOSIS — I35.9 AORTIC VALVE DISORDER: ICD-10-CM

## 2024-05-31 DIAGNOSIS — I25.10 ATHEROSCLEROSIS OF NATIVE CORONARY ARTERY OF NATIVE HEART WITHOUT ANGINA PECTORIS: ICD-10-CM

## 2024-05-31 DIAGNOSIS — I10 ESSENTIAL HYPERTENSION: ICD-10-CM

## 2024-05-31 PROCEDURE — 99214 OFFICE O/P EST MOD 30 MIN: CPT

## 2024-05-31 PROCEDURE — G2211 COMPLEX E/M VISIT ADD ON: HCPCS

## 2024-05-31 RX ORDER — GABAPENTIN 300 MG/1
300 CAPSULE ORAL DAILY
COMMUNITY
Start: 2024-05-11

## 2024-05-31 RX ORDER — CARVEDILOL 6.25 MG/1
6.25 TABLET ORAL 2 TIMES DAILY WITH MEALS
Qty: 60 TABLET | Refills: 11 | Status: SHIPPED | OUTPATIENT
Start: 2024-05-31 | End: 2024-06-21

## 2024-05-31 RX ORDER — OLOPATADINE HYDROCHLORIDE 2 MG/ML
1 SOLUTION/ DROPS OPHTHALMIC DAILY
COMMUNITY
Start: 2023-08-28 | End: 2024-05-31

## 2024-05-31 NOTE — LETTER
5/31/2024    No primary care provider on file.  No primary provider on file.    RE: Chandler Eldridge       Dear Colleague,     I had the pleasure of seeing Chandler Eldridge in the [facility].    HEART CARE ENCOUNTER CONSULTATON NOTE      [facility]  [phone]      Assessment/Recommendations   Assessment:   Coronary artery disease: Moderate disease with known 60% RCA lesion aspirin 81 mg - denies angina  Dyslipidemia: Recent LDL 55 on atorvastatin 80 mg  Severe aortic stenosis s/p TAVR: Mean gradient 15 mmHg, slightly increased from 1 year ago 11 mmHg aspirin 81 mg  Essential hypertension: 140/66 and similar on repeat on losartan 100 mg, amlodipine 10 mg, metoprolol succinate 50 mg  Mild aortic root dilation on echo 4.3 cm  Lower extremity swelling, obesity: After starting gabapentin months ago noticed lower extremity swelling and weight gain.  Symptoms have been stable, on exam today there is no evidence of congestion, and ED E'e on echo current swelling was not elevated        Plan:   Stop metoprolol and transition to carvedilol 6.25 mg twice daily.  Continue to monitor blood pressure with goal closer to <130/80 given evidence of aortic root dilation/HTN/CAD.  Consider increase to 12.5 mg twice daily pending blood pressure control and heart rate given resting bradycardia.  Offered follow-up visit in 1 month or MyChart communication without need for lab monitoring.  Patient elects to communicate through MyChart.  Continue to stay active and eat a healthy diet.  Will monitor weight gain on gabapentin as patient is concerned, although encouraged importance of continued healthy lifestyle, risk factor control as he is doing currently.  Repeat echocardiogram monitoring of TAVR in 1 year prior to next visit  Can try compression stockings for swelling        Follow up in 1 year or sooner as needed     History of Present Illness/Subjective    HPI: Chandler Eldridge is a 74 year old male  with PMHx of CAD, severe aortic stenosis post TAVR, HLD, HTN presents for follow-up.    Is doing very well lately.  He has been able to exercise and golf without pain in his leg after starting gabapentin.  He has noticed some side effects of weight gain and lower extremity swelling since starting gabapentin 2 months ago.  Swelling improves in the morning and with elevation of legs.  Just when he was starting to feel better in orthopedics since, he was diagnosed with bronchial pneumonia.  Is nearly recovered but still has some lingering cough and chest congestion.  He denies chest pain or shortness of breath with exertion or orthopnea.  He would really like to lose some weight, but even with quite healthy diet and more exercise now his weight has been stable.    Blood pressure is just elevated today, and his home is running more consistently around 140/60-70.  Heart rate seems to be at rest in the 50s.  Denies any lightheadedness or presyncope.      Echocardiogram 4/29/2024 Results:  The left ventricle is normal in size. There is mild to moderate concentric  left ventricular hypertrophy.  Left ventricular systolic function is normal. The visual ejection fraction is  60-65%. No regional wall motion abnormalities noted.  Grade I or early diastolic dysfunction.     The right ventricle is normal in size and function.  Normal left atrial size. Right atrial size is normal.  There is a 29 mm Jenkins Maverick 3 tissue valve in the aortic position which  appears well seated. There is mild paravalvular regurgitation. There is no  significant stenosis. Mean pressure gradient 15 mmHg, VMax 2.7 m/s which are  minimally increased when compared to previous study (11 mmHg, VMax 2.1m/s)  IVC diameter <2.1 cm collapsing >50% with sniff suggests a normal RA pressure  of 3 mmHg.  Aortic root dilatation is present measuring 4.3 cm.     When compared to previous study from 3/14/2022 the AV gradients are minimally  increased.    Coronary  angiogram 2020  LM:no obstruction  LAD:20-30% proximal/mid-vessel irregularity  Lcx:mildly irregular  RCA:dominant, mid-vessel 60% narrowing, similar to prior angio     Physical Examination  Review of Systems   Vitals: BP (!) 140/66 (BP Location: Right arm, Patient Position: Sitting, Cuff Size: Adult Large)   Pulse 54   Resp 20   Wt 111.5 kg (245 lb 12.8 oz)   SpO2 97%   BMI 38.50 kg/m    BMI= Body mass index is 38.5 kg/m .  Wt Readings from Last 3 Encounters:   05/31/24 111.5 kg (245 lb 12.8 oz)   04/07/23 111.5 kg (245 lb 12.8 oz)   03/17/22 109.9 kg (242 lb 4.8 oz)           ENT/Mouth: membranes moist, no oral lesions or bleeding gums.      EYES:  no scleral icterus, normal conjunctivae                    Neck: No carotid bruit or thyromegaly   Chest/Lungs:   lungs are clear to auscultation, no rales or wheezing, equal chest wall expansion    Cardiovascular:   Regular. Normal first and second heart sounds with mild systolic murmur, no rubs, or gallops; the carotid, radial and posterior tibial pulses are intact, Jugular venous pressure normal, mild LE edema bilaterally        Extremities: no cyanosis or clubbing   Skin: no xanthelasma, warm.    Neurologic:  no tremors     Psychiatric: alert and oriented x3, calm        Please refer above for cardiac ROS details.        Medical History  Surgical History Family History Social History   Past Medical History:   Diagnosis Date    Aortic stenosis     Arthritis     Coronary artery disease     Family history of myocardial infarction     mom dad    GERD (gastroesophageal reflux disease)     Hyperlipidemia     Hypertension     PONV (postoperative nausea and vomiting)     SOB (shortness of breath)      Past Surgical History:   Procedure Laterality Date    CARDIAC CATHETERIZATION      CHOLECYSTECTOMY  2017    CV CORONARY ANGIOGRAM N/A 1/22/2020    Procedure: Coronary Angiogram;  Surgeon: Jamar Almanza MD;  Location: [facility] Cath Lab;  Service: Cardiology    CV  LEFT HEART CATHETERIZATION WITHOUT LEFT VENTRICULOGRAM Left 1/22/2020    Procedure: Left Heart Catheterization Without Left Ventriculogram;  Surgeon: Jamar Almanza MD;  Location: [facility] Cath Lab;  Service: Cardiology    CV RIGHT HEART CATHETERIZATION N/A 1/22/2020    Procedure: Right Heart Catheterization;  Surgeon: Jamar Almanza MD;  Location: [facility] Cath Lab;  Service: Cardiology    CV TRANSCATHETER AORTIC VAVLE REPLACEMENT - FEMORAL APPROACH N/A 3/17/2020    Procedure: Transfemoral Transcatheter Aortic Valve Replacement;  Surgeon: Jamar Almanza MD;  Location: [facility] Cath Lab;  Service: Cardiology    ENDOSCOPIC RELEASE ULNAR NERVE (ELBOW) Left 10/5/2018    Procedure: ENDOSCOPIC RELEASE ULNAR NERVE (ELBOW);  Left Arm Endoscopic Cubital Tunnel Release;  Surgeon: Gene Garcia MD;  Location: [facility]    FOOT SURGERY Bilateral     HERNIA REPAIR  2002    BIHR done by Dr. Schmidt    LUMBAR SPINE SURGERY  2004    Decompression    SPINAL FUSION Bilateral 2009    TOTAL SHOULDER REPLACEMENT Left 11/2018    ZZC TOTAL HIP ARTHROPLASTY Left 5/11/2021    Procedure: LEFT TOTAL HIP ARTHROPLASTY;  Surgeon: Gene Corea MD;  Location: [facility];  Service: Orthopedics     Family History   Problem Relation Age of Onset    Coronary Artery Disease Mother     Coronary Artery Disease Father         Social History     Socioeconomic History    Marital status:      Spouse name: Not on file    Number of children: Not on file    Years of education: Not on file    Highest education level: Not on file   Occupational History    Not on file   Tobacco Use    Smoking status: Never    Smokeless tobacco: Never   Vaping Use    Vaping status: Never Used   Substance and Sexual Activity    Alcohol use: Not Currently    Drug use: No    Sexual activity: Yes   Other Topics Concern    Not on file   Social History Narrative    Not on file     Social Determinants of Health     Financial Resource  Strain: Not on file   Food Insecurity: Not on file   Transportation Needs: Not on file   Physical Activity: Not on file   Stress: Not on file   Social Connections: Unknown (1/3/2024)    Received from [facility] [address]    Social Connections     Frequency of Communication with Friends and Family: Not on file   Interpersonal Safety: Not on file   Housing Stability: Not on file           Medications  Allergies   Current Outpatient Medications   Medication Sig Dispense Refill    amLODIPine (NORVASC) 10 MG tablet TAKE 1 TABLET (10 MG) BY MOUTH DAILY. 90 tablet 1    aspirin 81 MG EC tablet Take 81 mg by mouth daily      atorvastatin (LIPITOR) 80 MG tablet Take 80 mg by mouth daily      gabapentin (NEURONTIN) 300 MG capsule Take 300 mg by mouth 3 times daily      losartan (COZAAR) 100 MG tablet Take 1 tablet by mouth daily at 2 pm      meloxicam (MOBIC) 15 MG tablet Take 15 mg by mouth as needed      metoprolol succinate ER (TOPROL XL) 25 MG 24 hr tablet TAKE 2 TABLETS BY MOUTH EVERY  tablet 1       Allergies   Allergen Reactions    Vancomycin Anxiety, Difficulty breathing, Hives, Itching, Other (See Comments), Palpitations and Rash     Other reaction(s): rash/hypertension/  Patient expresses that he will not use vancomycin again- d/t rash/itch/increased BP (has not tried premedication to tolerate.) He has tolerated clindamycin oral in the past.      Acetaminophen Nausea and Vomiting    Anesthesia S-I-40 [Propofol]      Other reaction(s): nausea    Celecoxib Itching    Niacin Other (See Comments)     Other reaction(s): flushing 11/02  flushing  Flushing      Oxycodone Nausea and Vomiting    Oxycodone-Acetaminophen      Other reaction(s): Gastrointestinal  N/V    Pcn [Penicillins] Rash          Lab Results    Chemistry/lipid CBC Cardiac Enzymes/BNP/TSH/INR   Recent Labs   Lab Test 05/01/24  1139   CHOL 143   HDL 39*   LDL 55   TRIG 247*     Recent Labs   Lab Test 05/01/24  1139  "04/19/23  0809 04/15/22  0845   LDL 55 68 94     Recent Labs   Lab Test 05/01/24  1139      POTASSIUM 4.9   CHLORIDE 102   CO2 26   *   BUN 14.8   CR 1.13   GFRESTIMATED 68   BAM 10.1     Recent Labs   Lab Test 05/01/24  1139 04/19/23  0809 12/08/22  0809   CR 1.13 1.21* 1.14     No results for input(s): \"A1C\" in the last 23678 hours.       Recent Labs   Lab Test 12/23/22  1204   WBC 8.7   HGB 14.4   HCT 43.1   MCV 87        Recent Labs   Lab Test 12/23/22  1204 05/12/21  0605 04/05/21  1408   HGB 14.4 11.8* 13.7*    No results for input(s): \"TROPONINI\" in the last 37390 hours.  No results for input(s): \"BNP\", \"NTBNPI\", \"NTBNP\" in the last 78805 hours.  No results for input(s): \"TSH\" in the last 11663 hours.  Recent Labs   Lab Test 05/11/21  1256 03/17/20  1014 03/17/20  0601   INR 1.05 1.17* 0.99          This note has been dictated using voice recognition software. Any grammatical, typographical, or context distortions are unintentional and inherent to the software    Ghazal Chilel PA-C          Thank you for allowing me to participate in the care of your patient.      Sincerely,     Ghazal Myrick PA-C     [facility]  cc:   Tico Andre MD  [address]  [place] [address]      "

## 2024-05-31 NOTE — PATIENT INSTRUCTIONS
It was a pleasure taking part in your care today:    - Start Carvedilol 6.25 mg twice daily  - Continue to monitor blood pressure. Goal blood pressure <130/80, watch heart rate >50 bpm, lightheadedness. Send update in 2 weeks about blood pressure.  - Stop metoprolol  - Follow up in 1 year. Call to Schedule in 3-4 months.    Please call the [facility] clinic with any questions or concerns at [phone].     Ghazal Chilel PA-C

## 2024-05-31 NOTE — PROGRESS NOTES
HEART CARE ENCOUNTER CONSULTATON NOTE      [facility]  [phone]      Assessment/Recommendations   Assessment:   Coronary artery disease: Moderate disease with known 60% RCA lesion aspirin 81 mg - denies angina  Dyslipidemia: Recent LDL 55 on atorvastatin 80 mg  Severe aortic stenosis s/p TAVR: Mean gradient 15 mmHg, slightly increased from 1 year ago 11 mmHg aspirin 81 mg  Essential hypertension: 140/66 and similar on repeat on losartan 100 mg, amlodipine 10 mg, metoprolol succinate 50 mg  Mild aortic root dilation on echo 4.3 cm  Lower extremity swelling, obesity: After starting gabapentin months ago noticed lower extremity swelling and weight gain.  Symptoms have been stable, on exam today there is no evidence of congestion, and ED E'e on echo current swelling was not elevated        Plan:   Stop metoprolol and transition to carvedilol 6.25 mg twice daily.  Continue to monitor blood pressure with goal closer to <130/80 given evidence of aortic root dilation/HTN/CAD.  Consider increase to 12.5 mg twice daily pending blood pressure control and heart rate given resting bradycardia.  Offered follow-up visit in 1 month or MyChart communication without need for lab monitoring.  Patient elects to communicate through MyChart.  Continue to stay active and eat a healthy diet.  Will monitor weight gain on gabapentin as patient is concerned, although encouraged importance of continued healthy lifestyle, risk factor control as he is doing currently.  Repeat echocardiogram monitoring of TAVR in 1 year prior to next visit  Can try compression stockings for swelling        Follow up in 1 year or sooner as needed     History of Present Illness/Subjective    HPI: Chandler Eldridge is a 74 year old male with PMHx of CAD, severe aortic stenosis post TAVR, HLD, HTN presents for follow-up.    Is doing very well lately.  He has been able to exercise and golf without pain in his leg after starting gabapentin.  He  has noticed some side effects of weight gain and lower extremity swelling since starting gabapentin 2 months ago.  Swelling improves in the morning and with elevation of legs.  Just when he was starting to feel better in orthopedics since, he was diagnosed with bronchial pneumonia.  Is nearly recovered but still has some lingering cough and chest congestion.  He denies chest pain or shortness of breath with exertion or orthopnea.  He would really like to lose some weight, but even with quite healthy diet and more exercise now his weight has been stable.    Blood pressure is just elevated today, and his home is running more consistently around 140/60-70.  Heart rate seems to be at rest in the 50s.  Denies any lightheadedness or presyncope.      Echocardiogram 4/29/2024 Results:  The left ventricle is normal in size. There is mild to moderate concentric  left ventricular hypertrophy.  Left ventricular systolic function is normal. The visual ejection fraction is  60-65%. No regional wall motion abnormalities noted.  Grade I or early diastolic dysfunction.     The right ventricle is normal in size and function.  Normal left atrial size. Right atrial size is normal.  There is a 29 mm Jenkins Maverick 3 tissue valve in the aortic position which  appears well seated. There is mild paravalvular regurgitation. There is no  significant stenosis. Mean pressure gradient 15 mmHg, VMax 2.7 m/s which are  minimally increased when compared to previous study (11 mmHg, VMax 2.1m/s)  IVC diameter <2.1 cm collapsing >50% with sniff suggests a normal RA pressure  of 3 mmHg.  Aortic root dilatation is present measuring 4.3 cm.     When compared to previous study from 3/14/2022 the AV gradients are minimally  increased.    Coronary angiogram 2020  LM:no obstruction  LAD:20-30% proximal/mid-vessel irregularity  Lcx:mildly irregular  RCA:dominant, mid-vessel 60% narrowing, similar to prior angio     Physical Examination  Review of Systems    Vitals: BP (!) 140/66 (BP Location: Right arm, Patient Position: Sitting, Cuff Size: Adult Large)   Pulse 54   Resp 20   Wt 111.5 kg (245 lb 12.8 oz)   SpO2 97%   BMI 38.50 kg/m    BMI= Body mass index is 38.5 kg/m .  Wt Readings from Last 3 Encounters:   05/31/24 111.5 kg (245 lb 12.8 oz)   04/07/23 111.5 kg (245 lb 12.8 oz)   03/17/22 109.9 kg (242 lb 4.8 oz)           ENT/Mouth: membranes moist, no oral lesions or bleeding gums.      EYES:  no scleral icterus, normal conjunctivae                    Neck: No carotid bruit or thyromegaly   Chest/Lungs:   lungs are clear to auscultation, no rales or wheezing, equal chest wall expansion    Cardiovascular:   Regular. Normal first and second heart sounds with mild systolic murmur, no rubs, or gallops; the carotid, radial and posterior tibial pulses are intact, Jugular venous pressure normal, mild LE edema bilaterally        Extremities: no cyanosis or clubbing   Skin: no xanthelasma, warm.    Neurologic:  no tremors     Psychiatric: alert and oriented x3, calm        Please refer above for cardiac ROS details.        Medical History  Surgical History Family History Social History   Past Medical History:   Diagnosis Date    Aortic stenosis     Arthritis     Coronary artery disease     Family history of myocardial infarction     mom dad    GERD (gastroesophageal reflux disease)     Hyperlipidemia     Hypertension     PONV (postoperative nausea and vomiting)     SOB (shortness of breath)      Past Surgical History:   Procedure Laterality Date    CARDIAC CATHETERIZATION      CHOLECYSTECTOMY  2017    CV CORONARY ANGIOGRAM N/A 1/22/2020    Procedure: Coronary Angiogram;  Surgeon: Jamar Almanza MD;  Location: [facility] Cath Lab;  Service: Cardiology    CV LEFT HEART CATHETERIZATION WITHOUT LEFT VENTRICULOGRAM Left 1/22/2020    Procedure: Left Heart Catheterization Without Left Ventriculogram;  Surgeon: Jamar Almanza MD;  Location: [facility] Cath Lab;   Service: Cardiology    CV RIGHT HEART CATHETERIZATION N/A 1/22/2020    Procedure: Right Heart Catheterization;  Surgeon: Jamar Almanza MD;  Location: [facility] Cath Lab;  Service: Cardiology    CV TRANSCATHETER AORTIC VAVLE REPLACEMENT - FEMORAL APPROACH N/A 3/17/2020    Procedure: Transfemoral Transcatheter Aortic Valve Replacement;  Surgeon: Jamar Almanza MD;  Location: [facility] Cath Lab;  Service: Cardiology    ENDOSCOPIC RELEASE ULNAR NERVE (ELBOW) Left 10/5/2018    Procedure: ENDOSCOPIC RELEASE ULNAR NERVE (ELBOW);  Left Arm Endoscopic Cubital Tunnel Release;  Surgeon: Gene Garcia MD;  Location: [place] OR    FOOT SURGERY Bilateral     HERNIA REPAIR  2002    BIHR done by Dr. Schmidt    LUMBAR SPINE SURGERY  2004    Decompression    SPINAL FUSION Bilateral 2009    TOTAL SHOULDER REPLACEMENT Left 11/2018    ZZC TOTAL HIP ARTHROPLASTY Left 5/11/2021    Procedure: LEFT TOTAL HIP ARTHROPLASTY;  Surgeon: Gene Corea MD;  Location: [facility];  Service: Orthopedics     Family History   Problem Relation Age of Onset    Coronary Artery Disease Mother     Coronary Artery Disease Father         Social History     Socioeconomic History    Marital status:      Spouse name: Not on file    Number of children: Not on file    Years of education: Not on file    Highest education level: Not on file   Occupational History    Not on file   Tobacco Use    Smoking status: Never    Smokeless tobacco: Never   Vaping Use    Vaping status: Never Used   Substance and Sexual Activity    Alcohol use: Not Currently    Drug use: No    Sexual activity: Yes   Other Topics Concern    Not on file   Social History Narrative    Not on file     Social Determinants of Health     Financial Resource Strain: Not on file   Food Insecurity: Not on file   Transportation Needs: Not on file   Physical Activity: Not on file   Stress: Not on file   Social Connections: Unknown (1/3/2024)    [facility]    Social Connections     Frequency of Communication with Friends and Family: Not on file   Interpersonal Safety: Not on file   Housing Stability: Not on file           Medications  Allergies   Current Outpatient Medications   Medication Sig Dispense Refill    amLODIPine (NORVASC) 10 MG tablet TAKE 1 TABLET (10 MG) BY MOUTH DAILY. 90 tablet 1    aspirin 81 MG EC tablet Take 81 mg by mouth daily      atorvastatin (LIPITOR) 80 MG tablet Take 80 mg by mouth daily      gabapentin (NEURONTIN) 300 MG capsule Take 300 mg by mouth 3 times daily      losartan (COZAAR) 100 MG tablet Take 1 tablet by mouth daily at 2 pm      meloxicam (MOBIC) 15 MG tablet Take 15 mg by mouth as needed      metoprolol succinate ER (TOPROL XL) 25 MG 24 hr tablet TAKE 2 TABLETS BY MOUTH EVERY  tablet 1       Allergies   Allergen Reactions    Vancomycin Anxiety, Difficulty breathing, Hives, Itching, Other (See Comments), Palpitations and Rash     Other reaction(s): rash/hypertension/  Patient expresses that he will not use vancomycin again- d/t rash/itch/increased BP (has not tried premedication to tolerate.) He has tolerated clindamycin oral in the past.      Acetaminophen Nausea and Vomiting    Anesthesia S-I-40 [Propofol]      Other reaction(s): nausea    Celecoxib Itching    Niacin Other (See Comments)     Other reaction(s): flushing 11/02  flushing  Flushing      Oxycodone Nausea and Vomiting    Oxycodone-Acetaminophen      Other reaction(s): Gastrointestinal  N/V    Pcn [Penicillins] Rash          Lab Results    Chemistry/lipid CBC Cardiac Enzymes/BNP/TSH/INR   Recent Labs   Lab Test 05/01/24  1139   CHOL 143   HDL 39*   LDL 55   TRIG 247*     Recent Labs   Lab Test 05/01/24  1139 04/19/23  0809 04/15/22  0845   LDL 55 68 94     Recent Labs   Lab Test 05/01/24  1139      POTASSIUM 4.9   CHLORIDE 102   CO2 26   *   BUN 14.8   CR 1.13   GFRESTIMATED 68   BAM 10.1     Recent Labs   Lab  "Test 05/01/24  1139 04/19/23  0809 12/08/22  0809   CR 1.13 1.21* 1.14     No results for input(s): \"A1C\" in the last 64494 hours.       Recent Labs   Lab Test 12/23/22  1204   WBC 8.7   HGB 14.4   HCT 43.1   MCV 87        Recent Labs   Lab Test 12/23/22  1204 05/12/21  0605 04/05/21  1408   HGB 14.4 11.8* 13.7*    No results for input(s): \"TROPONINI\" in the last 13231 hours.  No results for input(s): \"BNP\", \"NTBNPI\", \"NTBNP\" in the last 61971 hours.  No results for input(s): \"TSH\" in the last 59818 hours.  Recent Labs   Lab Test 05/11/21  1256 03/17/20  1014 03/17/20  0601   INR 1.05 1.17* 0.99          This note has been dictated using voice recognition software. Any grammatical, typographical, or context distortions are unintentional and inherent to the software    Ghazal Chilel PA-C                                       "

## 2024-06-19 ENCOUNTER — MYC MEDICAL ADVICE (OUTPATIENT)
Dept: CARDIOLOGY | Facility: CLINIC | Age: 74
End: 2024-06-19
Payer: COMMERCIAL

## 2024-06-19 DIAGNOSIS — I10 ESSENTIAL HYPERTENSION: ICD-10-CM

## 2024-06-21 RX ORDER — CARVEDILOL 12.5 MG/1
12.5 TABLET ORAL 2 TIMES DAILY WITH MEALS
Qty: 180 TABLET | Refills: 2 | Status: SHIPPED | OUTPATIENT
Start: 2024-06-21 | End: 2024-09-06 | Stop reason: SINTOL

## 2024-09-05 NOTE — PROGRESS NOTES
HEART CARE ENCOUNTER CONSULTATON NOTE      [facility]  [phone]      Assessment/Recommendations   Assessment:   Essential hypertension: 126/64 and repeat 138/62.  Home monitoring shows average 140/70, goal less than 130/80 with aortic root dilation.  BP increased since transitioning from metoprolol to carvedilol.  Currently on losartan 100 mg, amlodipine 10 mg, carvedilol 12.5 mg BID  Coronary artery disease: Moderate disease with known 60% RCA lesion per angiogram 2020 - aspirin 81 mg - denies angina  Dyslipidemia: Controlled on atorvastatin 80 mg  Severe aortic stenosis s/p TAVR: Mean gradient 15 mmHg, Vmax 2.7 m/s slightly increased from 1 year ago from 11 mmHg   Mild aortic root dilation on echo 4.3 cm: Continue to monitor, BP control  Meniscus injury: requiring surgery, yet to be scheduled      Plan:   Stop carvedilol and transition back to metoprolol succinate 50 mg once daily..  Continue to monitor blood pressure with goal closer to <130/80 given evidence of aortic root dilation/HTN/CAD.  Consider addition of hydrochlorothiazide without optimal control.  Repeat echocardiogram monitoring of TAVR 4/2025      Preoperative cardiovascular risk assessment:   Assessment of preoperative cardiac risk prior to meniscus repair: He has no active cardiac conditions (unstable coronary syndromes, decompensated HF, significant arrhythmias, or severe valvular disease), has known coronary artery disease, with last coronary angiogram 1/2020 showing moderate nonobstructive disease, follow-up NM stress test 4/2021 without ischemia/infarction.  Echocardiogram 4/2024 with preserved LVEF and appropriate function of TAVR valve.  He has a functional capacity > than 4 METs, and has recently performed regular cardiovascular exercise, 2 flights of stairs, heavy lifting during yard work including shoveling sawing/removal of tree stump without typical angina, severe exertional dyspnea/fatigue.    Surgical  Recommendation(s):   Based on clinical risk and cardiac condition it is recommended that the patient proceeds with operation without further cardiac testing or interventions at this time.  Will require preoperative visit with PCP.         History of Present Illness/Subjective    HPI: Chandler Eldridge is a 74 year old male with PMHx of CAD, severe aortic stenosis post TAVR, HLD, HTN presents for hypertension follow-up.    Patient reports blood pressure at home has been more consistently around 140/70 on average.  Measurements stay closely around this number.  Believes blood pressure has gotten higher since transitioning from carvedilol to metoprolol which is unexpected.  Heart rates in the 50s.  Feels a bit lightheaded with bending over doing vigorous yard work recently after medication change.  Does not think carvedilol is working.    Patient reports injury to knee that will now require repair to the meniscus surgically.  He continues to deny exertional dyspnea, chest pain.  Prior to injury he was walking regularly 40-45 minutes, walking the golf [place].  Since he has had to reduce ambulatory exercise, although has recently been doing a large amount of yard work.  This can included removal of tree stump, shoveling/sawing in hot and humid weather during which he experienced no chest pain, abnormal shortness of breath.    Wife says that patient snores.  No witnessed apnea.  Patient feels well rested throughout the day.  Does not feel if apnea is involved.      Echocardiogram 4/29/2024 Results:  The left ventricle is normal in size. There is mild to moderate concentric  left ventricular hypertrophy.  Left ventricular systolic function is normal. The visual ejection fraction is  60-65%. No regional wall motion abnormalities noted.  Grade I or early diastolic dysfunction.     The right ventricle is normal in size and function.  Normal left atrial size. Right atrial size is normal.  There is a 29 mm Jenkins Maverick 3 tissue  "valve in the aortic position which  appears well seated. There is mild paravalvular regurgitation. There is no  significant stenosis. Mean pressure gradient 15 mmHg, VMax 2.7 m/s which are  minimally increased when compared to previous study (11 mmHg, VMax 2.1m/s)  IVC diameter <2.1 cm collapsing >50% with sniff suggests a normal RA pressure  of 3 mmHg.  Aortic root dilatation is present measuring 4.3 cm.     When compared to previous study from 3/14/2022 the AV gradients are minimally  increased.    Coronary angiogram 2020  LM:no obstruction  LAD:20-30% proximal/mid-vessel irregularity  Lcx:mildly irregular  RCA:dominant, mid-vessel 60% narrowing, similar to prior angio     Physical Examination  Review of Systems   Vitals: /64 (BP Location: Right arm, Patient Position: Sitting, Cuff Size: Adult Large)   Pulse 60   Resp 16   Ht 1.689 m (5' 6.5\")   Wt 109.8 kg (242 lb)   BMI 38.47 kg/m    BMI= Body mass index is 38.47 kg/m .  Wt Readings from Last 3 Encounters:   09/06/24 109.8 kg (242 lb)   05/31/24 111.5 kg (245 lb 12.8 oz)   04/07/23 111.5 kg (245 lb 12.8 oz)           ENT/Mouth: membranes moist, no oral lesions or bleeding gums.      EYES:  no scleral icterus, normal conjunctivae       Chest/Lungs:   lungs are clear to auscultation, no rales or wheezing, equal chest wall expansion    Cardiovascular:   Regular. Normal first and second heart sounds with mild systolic murmur, no rubs, or gallops; the radial and posterior tibial pulses are intact, mild LE edema bilaterally        Extremities: no cyanosis or clubbing   Skin: no xanthelasma, warm.    Neurologic:  no tremors     Psychiatric: alert and oriented x3, calm        Please refer above for cardiac ROS details.        Medical History  Surgical History Family History Social History   Past Medical History:   Diagnosis Date    Aortic stenosis     Arthritis     Coronary artery disease     Family history of myocardial infarction     mom dad    GERD " (gastroesophageal reflux disease)     Hyperlipidemia     Hypertension     PONV (postoperative nausea and vomiting)     SOB (shortness of breath)      Past Surgical History:   Procedure Laterality Date    CARDIAC CATHETERIZATION      CHOLECYSTECTOMY  2017    CV CORONARY ANGIOGRAM N/A 1/22/2020    Procedure: Coronary Angiogram;  Surgeon: Jamar Almanza MD;  Location: [facility];  Service: Cardiology    CV LEFT HEART CATHETERIZATION WITHOUT LEFT VENTRICULOGRAM Left 1/22/2020    Procedure: Left Heart Catheterization Without Left Ventriculogram;  Surgeon: Jamar Almanza MD;  Location: [facility];  Service: Cardiology    CV RIGHT HEART CATHETERIZATION N/A 1/22/2020    Procedure: Right Heart Catheterization;  Surgeon: Jamar Almanza MD;  Location: [facility];  Service: Cardiology    CV TRANSCATHETER AORTIC VAVLE REPLACEMENT - FEMORAL APPROACH N/A 3/17/2020    Procedure: Transfemoral Transcatheter Aortic Valve Replacement;  Surgeon: Jamar Almanza MD;  Location: [facility];  Service: Cardiology    ENDOSCOPIC RELEASE ULNAR NERVE (ELBOW) Left 10/5/2018    Procedure: ENDOSCOPIC RELEASE ULNAR NERVE (ELBOW);  Left Arm Endoscopic Cubital Tunnel Release;  Surgeon: Gene Garcia MD;  Location: [facility]    FOOT SURGERY Bilateral     HERNIA REPAIR  2002    BIHR done by Dr. Schmidt    LUMBAR SPINE SURGERY  2004    Decompression    SPINAL FUSION Bilateral 2009    TOTAL SHOULDER REPLACEMENT Left 11/2018    ZC TOTAL HIP ARTHROPLASTY Left 5/11/2021    Procedure: LEFT TOTAL HIP ARTHROPLASTY;  Surgeon: Gene Corea MD;  Location: [facility];  Service: Orthopedics     Family History   Problem Relation Age of Onset    Coronary Artery Disease Mother     Coronary Artery Disease Father         Social History     Socioeconomic History    Marital status:      Spouse name: Not on file    Number of children: Not on file    Years of education: Not on file     Highest education level: Not on file   Occupational History    Not on file   Tobacco Use    Smoking status: Never    Smokeless tobacco: Never   Vaping Use    Vaping status: Never Used   Substance and Sexual Activity    Alcohol use: Not Currently    Drug use: No    Sexual activity: Yes   Other Topics Concern    Not on file   Social History Narrative    Not on file     Social Determinants of Health     Financial Resource Strain: Not on file   Food Insecurity: Not on file   Transportation Needs: Not on file   Physical Activity: Not on file   Stress: Not on file   Social Connections: Unknown (1/3/2024)    Received from [facility]    Social Connections     Frequency of Communication with Friends and Family: Not on file   Interpersonal Safety: Not on file   Housing Stability: Not on file           Medications  Allergies   Current Outpatient Medications   Medication Sig Dispense Refill    acetaminophen (TYLENOL) 500 MG tablet Take 500 mg by mouth as needed.      amLODIPine (NORVASC) 10 MG tablet TAKE 1 TABLET (10 MG) BY MOUTH DAILY. 90 tablet 1    aspirin 81 MG EC tablet Take 81 mg by mouth daily      atorvastatin (LIPITOR) 80 MG tablet Take 80 mg by mouth daily      carvedilol (COREG) 12.5 MG tablet Take 1 tablet (12.5 mg) by mouth 2 times daily (with meals) 180 tablet 2    gabapentin (NEURONTIN) 300 MG capsule Take 300 mg by mouth daily.      losartan (COZAAR) 100 MG tablet Take 1 tablet by mouth daily at 2 pm      meloxicam (MOBIC) 15 MG tablet Take 15 mg by mouth as needed         Allergies   Allergen Reactions    Vancomycin Anxiety, Difficulty breathing, Hives, Itching, Other (See Comments), Palpitations and Rash     Other reaction(s): rash/hypertension/  Patient expresses that he will not use vancomycin again- d/t rash/itch/increased BP (has not tried premedication to tolerate.) He has tolerated clindamycin oral in the past.      Acetaminophen Nausea and Vomiting    Anesthesia S-I-40  "[Propofol]      Other reaction(s): nausea    Celecoxib Itching    Niacin Other (See Comments)     Other reaction(s): flushing 11/02  flushing  Flushing      Oxycodone Nausea and Vomiting    Oxycodone-Acetaminophen      Other reaction(s): Gastrointestinal  N/V    Pcn [Penicillins] Rash          Lab Results    Chemistry/lipid CBC Cardiac Enzymes/BNP/TSH/INR   Recent Labs   Lab Test 05/01/24  1139   CHOL 143   HDL 39*   LDL 55   TRIG 247*     Recent Labs   Lab Test 05/01/24  1139 04/19/23  0809 04/15/22  0845   LDL 55 68 94     Recent Labs   Lab Test 05/01/24  1139      POTASSIUM 4.9   CHLORIDE 102   CO2 26   *   BUN 14.8   CR 1.13   GFRESTIMATED 68   BAM 10.1     Recent Labs   Lab Test 05/01/24  1139 04/19/23  0809 12/08/22  0809   CR 1.13 1.21* 1.14     No results for input(s): \"A1C\" in the last 89583 hours.       Recent Labs   Lab Test 12/23/22  1204   WBC 8.7   HGB 14.4   HCT 43.1   MCV 87        Recent Labs   Lab Test 12/23/22  1204 05/12/21  0605 04/05/21  1408   HGB 14.4 11.8* 13.7*    No results for input(s): \"TROPONINI\" in the last 43066 hours.  No results for input(s): \"BNP\", \"NTBNPI\", \"NTBNP\" in the last 20436 hours.  No results for input(s): \"TSH\" in the last 49451 hours.  Recent Labs   Lab Test 05/11/21  1256 03/17/20  1014 03/17/20  0601   INR 1.05 1.17* 0.99          This note has been dictated using voice recognition software. Any grammatical, typographical, or context distortions are unintentional and inherent to the software    Ghazal Chilel PA-C                                       "

## 2024-09-06 ENCOUNTER — OFFICE VISIT (OUTPATIENT)
Dept: CARDIOLOGY | Facility: CLINIC | Age: 74
End: 2024-09-06
Payer: COMMERCIAL

## 2024-09-06 VITALS
HEIGHT: 67 IN | DIASTOLIC BLOOD PRESSURE: 62 MMHG | WEIGHT: 242 LBS | HEART RATE: 60 BPM | BODY MASS INDEX: 37.98 KG/M2 | RESPIRATION RATE: 16 BRPM | SYSTOLIC BLOOD PRESSURE: 138 MMHG

## 2024-09-06 DIAGNOSIS — I10 ESSENTIAL HYPERTENSION: Primary | ICD-10-CM

## 2024-09-06 DIAGNOSIS — Z01.810 PRE-OPERATIVE CARDIOVASCULAR EXAMINATION: ICD-10-CM

## 2024-09-06 DIAGNOSIS — Z95.2 S/P TAVR (TRANSCATHETER AORTIC VALVE REPLACEMENT): ICD-10-CM

## 2024-09-06 DIAGNOSIS — I25.10 CORONARY ARTERY DISEASE INVOLVING NATIVE CORONARY ARTERY OF NATIVE HEART WITHOUT ANGINA PECTORIS: ICD-10-CM

## 2024-09-06 PROCEDURE — 99214 OFFICE O/P EST MOD 30 MIN: CPT

## 2024-09-06 RX ORDER — ACETAMINOPHEN 500 MG
500 TABLET ORAL PRN
COMMUNITY

## 2024-09-06 RX ORDER — METOPROLOL SUCCINATE 25 MG/1
50 TABLET, EXTENDED RELEASE ORAL DAILY
Qty: 90 TABLET | Refills: 3 | Status: SHIPPED
Start: 2024-09-06

## 2024-09-06 NOTE — PATIENT INSTRUCTIONS
It was a pleasure taking part in your care today:    - Switch back to metoprolol 50 mg daily tomorrow. STOP carvedilol.   - Monitor BP, consider addition of hydrochlorothiazide.  - Continue amlodipine and losartan  - Repeat echocardiogram April 2025 for TAVR  - 2-3 months follow up with me    Please call the [facility] clinic with any questions or concerns at [phone].     Ghazal Chilel PA-C

## 2024-09-06 NOTE — LETTER
9/6/2024    Zack Mims MD  [facility] [address]  [place] [address]    RE: Chandler Eldridge       Dear Colleague,     I had the pleasure of seeing Chandler Eldridge in the [facility].    HEART CARE ENCOUNTER CONSULTATON NOTE      [facility]  [phone]      Assessment/Recommendations   Assessment:   Essential hypertension: 126/64 and repeat 138/62.  Home monitoring shows average 140/70, goal less than 130/80 with aortic root dilation.  BP increased since transitioning from metoprolol to carvedilol.  Currently on losartan 100 mg, amlodipine 10 mg, carvedilol 12.5 mg BID  Coronary artery disease: Moderate disease with known 60% RCA lesion per angiogram 2020 - aspirin 81 mg - denies angina  Dyslipidemia: Controlled on atorvastatin 80 mg  Severe aortic stenosis s/p TAVR: Mean gradient 15 mmHg, Vmax 2.7 m/s slightly increased from 1 year ago from 11 mmHg   Mild aortic root dilation on echo 4.3 cm: Continue to monitor, BP control  Meniscus injury: requiring surgery, yet to be scheduled      Plan:   Stop carvedilol and transition back to metoprolol succinate 50 mg once daily..  Continue to monitor blood pressure with goal closer to <130/80 given evidence of aortic root dilation/HTN/CAD.  Consider addition of hydrochlorothiazide without optimal control.  Repeat echocardiogram monitoring of TAVR 4/2025      Preoperative cardiovascular risk assessment:   Assessment of preoperative cardiac risk prior to meniscus repair: He has no active cardiac conditions (unstable coronary syndromes, decompensated HF, significant arrhythmias, or severe valvular disease), has known coronary artery disease, with last coronary angiogram 1/2020 showing moderate nonobstructive disease, follow-up NM stress test 4/2021 without ischemia/infarction.  Echocardiogram 4/2024 with preserved LVEF and appropriate function of TAVR valve.  He has a functional capacity > than 4  METs, and has recently performed regular cardiovascular exercise, 2 flights of stairs, heavy lifting during yard work including shoveling sawing/removal of tree stump without typical angina, severe exertional dyspnea/fatigue.    Surgical Recommendation(s):   Based on clinical risk and cardiac condition it is recommended that the patient proceeds with operation without further cardiac testing or interventions at this time.  Will require preoperative visit with PCP.         History of Present Illness/Subjective    HPI: Chandler Eldridge is a 74 year old male with PMHx of CAD, severe aortic stenosis post TAVR, HLD, HTN presents for hypertension follow-up.    Patient reports blood pressure at home has been more consistently around 140/70 on average.  Measurements stay closely around this number.  Believes blood pressure has gotten higher since transitioning from carvedilol to metoprolol which is unexpected.  Heart rates in the 50s.  Feels a bit lightheaded with bending over doing vigorous yard work recently after medication change.  Does not think carvedilol is working.    Patient reports injury to knee that will now require repair to the meniscus surgically.  He continues to deny exertional dyspnea, chest pain.  Prior to injury he was walking regularly 40-45 minutes, walking [place].  Since he has had to reduce ambulatory exercise, although has recently been doing a large amount of yard work.  This can included removal of tree stump, shoveling/sawing in hot and humid weather during which he experienced no chest pain, abnormal shortness of breath.    Wife says that patient snores.  No witnessed apnea.  Patient feels well rested throughout the day.  Does not feel if apnea is involved.      Echocardiogram 4/29/2024 Results:  The left ventricle is normal in size. There is mild to moderate concentric  left ventricular hypertrophy.  Left ventricular systolic function is normal. The visual ejection fraction is  60-65%.  "No regional wall motion abnormalities noted.  Grade I or early diastolic dysfunction.     The right ventricle is normal in size and function.  Normal left atrial size. Right atrial size is normal.  There is a 29 mm Jenkins Maverick 3 tissue valve in the aortic position which  appears well seated. There is mild paravalvular regurgitation. There is no  significant stenosis. Mean pressure gradient 15 mmHg, VMax 2.7 m/s which are  minimally increased when compared to previous study (11 mmHg, VMax 2.1m/s)  IVC diameter <2.1 cm collapsing >50% with sniff suggests a normal RA pressure  of 3 mmHg.  Aortic root dilatation is present measuring 4.3 cm.     When compared to previous study from 3/14/2022 the AV gradients are minimally  increased.    Coronary angiogram 2020  LM:no obstruction  LAD:20-30% proximal/mid-vessel irregularity  Lcx:mildly irregular  RCA:dominant, mid-vessel 60% narrowing, similar to prior angio     Physical Examination  Review of Systems   Vitals: /64 (BP Location: Right arm, Patient Position: Sitting, Cuff Size: Adult Large)   Pulse 60   Resp 16   Ht 1.689 m (5' 6.5\")   Wt 109.8 kg (242 lb)   BMI 38.47 kg/m    BMI= Body mass index is 38.47 kg/m .  Wt Readings from Last 3 Encounters:   09/06/24 109.8 kg (242 lb)   05/31/24 111.5 kg (245 lb 12.8 oz)   04/07/23 111.5 kg (245 lb 12.8 oz)           ENT/Mouth: membranes moist, no oral lesions or bleeding gums.      EYES:  no scleral icterus, normal conjunctivae       Chest/Lungs:   lungs are clear to auscultation, no rales or wheezing, equal chest wall expansion    Cardiovascular:   Regular. Normal first and second heart sounds with mild systolic murmur, no rubs, or gallops; the radial and posterior tibial pulses are intact, mild LE edema bilaterally        Extremities: no cyanosis or clubbing   Skin: no xanthelasma, warm.    Neurologic:  no tremors     Psychiatric: alert and oriented x3, calm        Please refer above for cardiac ROS details.    "     Medical History  Surgical History Family History Social History   Past Medical History:   Diagnosis Date     Aortic stenosis      Arthritis      Coronary artery disease      Family history of myocardial infarction     mom dad     GERD (gastroesophageal reflux disease)      Hyperlipidemia      Hypertension      PONV (postoperative nausea and vomiting)      SOB (shortness of breath)      Past Surgical History:   Procedure Laterality Date     CARDIAC CATHETERIZATION       CHOLECYSTECTOMY  2017     CV CORONARY ANGIOGRAM N/A 1/22/2020    Procedure: Coronary Angiogram;  Surgeon: Jamar Almanza MD;  Location: [facility];  Service: Cardiology     CV LEFT HEART CATHETERIZATION WITHOUT LEFT VENTRICULOGRAM Left 1/22/2020    Procedure: Left Heart Catheterization Without Left Ventriculogram;  Surgeon: Jamar Almanza MD;  Location: [facility];  Service: Cardiology     CV RIGHT HEART CATHETERIZATION N/A 1/22/2020    Procedure: Right Heart Catheterization;  Surgeon: Jamar Almanza MD;  Location: [facility];  Service: Cardiology     CV TRANSCATHETER AORTIC VAVLE REPLACEMENT - FEMORAL APPROACH N/A 3/17/2020    Procedure: Transfemoral Transcatheter Aortic Valve Replacement;  Surgeon: Jamar Almanza MD;  Location: [facility];  Service: Cardiology     ENDOSCOPIC RELEASE ULNAR NERVE (ELBOW) Left 10/5/2018    Procedure: ENDOSCOPIC RELEASE ULNAR NERVE (ELBOW);  Left Arm Endoscopic Cubital Tunnel Release;  Surgeon: Gene Garcia MD;  Location: [facility]     FOOT SURGERY Bilateral      HERNIA REPAIR  2002    BIHR done by Dr. Schmidt     LUMBAR SPINE SURGERY  2004    Decompression     SPINAL FUSION Bilateral 2009     TOTAL SHOULDER REPLACEMENT Left 11/2018     ZZC TOTAL HIP ARTHROPLASTY Left 5/11/2021    Procedure: LEFT TOTAL HIP ARTHROPLASTY;  Surgeon: Gene Corea MD;  Location: [facility];  Service: Orthopedics     Family History   Problem Relation Age of  Onset     Coronary Artery Disease Mother      Coronary Artery Disease Father         Social History     Socioeconomic History     Marital status:      Spouse name: Not on file     Number of children: Not on file     Years of education: Not on file     Highest education level: Not on file   Occupational History     Not on file   Tobacco Use     Smoking status: Never     Smokeless tobacco: Never   Vaping Use     Vaping status: Never Used   Substance and Sexual Activity     Alcohol use: Not Currently     Drug use: No     Sexual activity: Yes   Other Topics Concern     Not on file   Social History Narrative     Not on file     Social Determinants of Health     Financial Resource Strain: Not on file   Food Insecurity: Not on file   Transportation Needs: Not on file   Physical Activity: Not on file   Stress: Not on file   Social Connections: Unknown (1/3/2024)    Received from [facility]    Social Connections      Frequency of Communication with Friends and Family: Not on file   Interpersonal Safety: Not on file   Housing Stability: Not on file           Medications  Allergies   Current Outpatient Medications   Medication Sig Dispense Refill     acetaminophen (TYLENOL) 500 MG tablet Take 500 mg by mouth as needed.       amLODIPine (NORVASC) 10 MG tablet TAKE 1 TABLET (10 MG) BY MOUTH DAILY. 90 tablet 1     aspirin 81 MG EC tablet Take 81 mg by mouth daily       atorvastatin (LIPITOR) 80 MG tablet Take 80 mg by mouth daily       carvedilol (COREG) 12.5 MG tablet Take 1 tablet (12.5 mg) by mouth 2 times daily (with meals) 180 tablet 2     gabapentin (NEURONTIN) 300 MG capsule Take 300 mg by mouth daily.       losartan (COZAAR) 100 MG tablet Take 1 tablet by mouth daily at 2 pm       meloxicam (MOBIC) 15 MG tablet Take 15 mg by mouth as needed         Allergies   Allergen Reactions     Vancomycin Anxiety, Difficulty breathing, Hives, Itching, Other (See Comments), Palpitations and  "Rash     Other reaction(s): rash/hypertension/  Patient expresses that he will not use vancomycin again- d/t rash/itch/increased BP (has not tried premedication to tolerate.) He has tolerated clindamycin oral in the past.       Acetaminophen Nausea and Vomiting     Anesthesia S-I-40 [Propofol]      Other reaction(s): nausea     Celecoxib Itching     Niacin Other (See Comments)     Other reaction(s): flushing 11/02  flushing  Flushing       Oxycodone Nausea and Vomiting     Oxycodone-Acetaminophen      Other reaction(s): Gastrointestinal  N/V     Pcn [Penicillins] Rash          Lab Results    Chemistry/lipid CBC Cardiac Enzymes/BNP/TSH/INR   Recent Labs   Lab Test 05/01/24  1139   CHOL 143   HDL 39*   LDL 55   TRIG 247*     Recent Labs   Lab Test 05/01/24  1139 04/19/23  0809 04/15/22  0845   LDL 55 68 94     Recent Labs   Lab Test 05/01/24  1139      POTASSIUM 4.9   CHLORIDE 102   CO2 26   *   BUN 14.8   CR 1.13   GFRESTIMATED 68   BAM 10.1     Recent Labs   Lab Test 05/01/24  1139 04/19/23  0809 12/08/22  0809   CR 1.13 1.21* 1.14     No results for input(s): \"A1C\" in the last 60736 hours.       Recent Labs   Lab Test 12/23/22  1204   WBC 8.7   HGB 14.4   HCT 43.1   MCV 87        Recent Labs   Lab Test 12/23/22  1204 05/12/21  0605 04/05/21  1408   HGB 14.4 11.8* 13.7*    No results for input(s): \"TROPONINI\" in the last 66735 hours.  No results for input(s): \"BNP\", \"NTBNPI\", \"NTBNP\" in the last 14444 hours.  No results for input(s): \"TSH\" in the last 01256 hours.  Recent Labs   Lab Test 05/11/21  1256 03/17/20  1014 03/17/20  0601   INR 1.05 1.17* 0.99          This note has been dictated using voice recognition software. Any grammatical, typographical, or context distortions are unintentional and inherent to the software    Ghazal Chilel PA-C                                         Thank you for allowing me to participate in the care of your patient.      Sincerely,     Ghazal Myrick, " MATY     [facility]  cc:   Tico Andre MD  [address]  [place] [address]

## 2024-10-11 ENCOUNTER — LAB REQUISITION (OUTPATIENT)
Dept: LAB | Facility: CLINIC | Age: 74
End: 2024-10-11

## 2024-10-11 DIAGNOSIS — R30.0 DYSURIA: ICD-10-CM

## 2024-10-11 PROCEDURE — 87088 URINE BACTERIA CULTURE: CPT | Performed by: FAMILY MEDICINE

## 2024-10-13 LAB — BACTERIA UR CULT: ABNORMAL

## 2024-10-25 ENCOUNTER — LAB REQUISITION (OUTPATIENT)
Dept: LAB | Facility: CLINIC | Age: 74
End: 2024-10-25

## 2024-10-25 DIAGNOSIS — Z01.818 ENCOUNTER FOR OTHER PREPROCEDURAL EXAMINATION: ICD-10-CM

## 2024-10-25 PROCEDURE — 80048 BASIC METABOLIC PNL TOTAL CA: CPT | Performed by: STUDENT IN AN ORGANIZED HEALTH CARE EDUCATION/TRAINING PROGRAM

## 2024-10-26 LAB
ANION GAP SERPL CALCULATED.3IONS-SCNC: 12 MMOL/L (ref 7–15)
BUN SERPL-MCNC: 20.1 MG/DL (ref 8–23)
CALCIUM SERPL-MCNC: 10 MG/DL (ref 8.8–10.4)
CHLORIDE SERPL-SCNC: 101 MMOL/L (ref 98–107)
CREAT SERPL-MCNC: 1.12 MG/DL (ref 0.67–1.17)
EGFRCR SERPLBLD CKD-EPI 2021: 69 ML/MIN/1.73M2
GLUCOSE SERPL-MCNC: 128 MG/DL (ref 70–99)
HCO3 SERPL-SCNC: 25 MMOL/L (ref 22–29)
POTASSIUM SERPL-SCNC: 4.2 MMOL/L (ref 3.4–5.3)
SODIUM SERPL-SCNC: 138 MMOL/L (ref 135–145)

## 2024-11-08 DIAGNOSIS — I25.10 ATHEROSCLEROSIS OF NATIVE CORONARY ARTERY OF NATIVE HEART WITHOUT ANGINA PECTORIS: ICD-10-CM

## 2024-11-08 DIAGNOSIS — I10 ESSENTIAL HYPERTENSION: ICD-10-CM

## 2024-11-08 RX ORDER — AMLODIPINE BESYLATE 10 MG/1
10 TABLET ORAL DAILY
Qty: 90 TABLET | Refills: 2 | Status: SHIPPED | OUTPATIENT
Start: 2024-11-08

## 2024-12-01 ENCOUNTER — HEALTH MAINTENANCE LETTER (OUTPATIENT)
Age: 74
End: 2024-12-01

## 2025-01-09 ENCOUNTER — MYC MEDICAL ADVICE (OUTPATIENT)
Dept: CARDIOLOGY | Facility: CLINIC | Age: 75
End: 2025-01-09
Payer: COMMERCIAL

## 2025-01-13 NOTE — TELEPHONE ENCOUNTER
Called patient to review recent elevated blood pressure reading.  Per [place] Community Measures guidelines, patients blood pressure is out of parameters and recheck blood pressure is recommended.    Last Blood Pressure: 140/60  Last Heart Rate: 59  Date: 12/6/2024  Location: [facility]    Today's Blood Pressure: 134/71  Today's Heart Rate: 59  Location: Home BP    Patient reported blood pressure updated in Epic. Blood pressure falls within [place] Community Measures guidelines.  Patient will follow up as previously advised.     Patient communicated via MyChart messaging.

## 2025-01-16 VITALS — DIASTOLIC BLOOD PRESSURE: 71 MMHG | SYSTOLIC BLOOD PRESSURE: 134 MMHG | HEART RATE: 59 BPM

## 2025-02-04 ENCOUNTER — LAB REQUISITION (OUTPATIENT)
Dept: LAB | Facility: CLINIC | Age: 75
End: 2025-02-04

## 2025-02-04 DIAGNOSIS — Z01.818 ENCOUNTER FOR OTHER PREPROCEDURAL EXAMINATION: ICD-10-CM

## 2025-02-04 LAB
ANION GAP SERPL CALCULATED.3IONS-SCNC: 12 MMOL/L (ref 7–15)
BUN SERPL-MCNC: 16.7 MG/DL (ref 8–23)
CALCIUM SERPL-MCNC: 10 MG/DL (ref 8.8–10.4)
CHLORIDE SERPL-SCNC: 103 MMOL/L (ref 98–107)
CREAT SERPL-MCNC: 1.08 MG/DL (ref 0.67–1.17)
EGFRCR SERPLBLD CKD-EPI 2021: 72 ML/MIN/1.73M2
GLUCOSE SERPL-MCNC: 116 MG/DL (ref 70–99)
HCO3 SERPL-SCNC: 24 MMOL/L (ref 22–29)
POTASSIUM SERPL-SCNC: 4.3 MMOL/L (ref 3.4–5.3)
SODIUM SERPL-SCNC: 139 MMOL/L (ref 135–145)

## 2025-02-04 PROCEDURE — 82374 ASSAY BLOOD CARBON DIOXIDE: CPT | Performed by: STUDENT IN AN ORGANIZED HEALTH CARE EDUCATION/TRAINING PROGRAM

## 2025-02-04 PROCEDURE — 80048 BASIC METABOLIC PNL TOTAL CA: CPT | Performed by: STUDENT IN AN ORGANIZED HEALTH CARE EDUCATION/TRAINING PROGRAM

## 2025-06-02 ENCOUNTER — HOSPITAL ENCOUNTER (OUTPATIENT)
Dept: CARDIOLOGY | Facility: CLINIC | Age: 75
Discharge: HOME | End: 2025-06-02
Payer: COMMERCIAL

## 2025-06-02 DIAGNOSIS — Z95.2 S/P TAVR (TRANSCATHETER AORTIC VALVE REPLACEMENT): ICD-10-CM

## 2025-06-02 LAB — LVEF ECHO: NORMAL

## 2025-06-02 PROCEDURE — 93306 TTE W/DOPPLER COMPLETE: CPT | Mod: 26 | Performed by: INTERNAL MEDICINE

## 2025-06-02 PROCEDURE — 93306 TTE W/DOPPLER COMPLETE: CPT

## 2025-06-09 ENCOUNTER — RESULTS FOLLOW-UP (OUTPATIENT)
Dept: CARDIOLOGY | Facility: CLINIC | Age: 75
End: 2025-06-09
Payer: COMMERCIAL

## 2025-06-24 ENCOUNTER — TRANSFERRED RECORDS (OUTPATIENT)
Dept: HEALTH INFORMATION MANAGEMENT | Facility: CLINIC | Age: 75
End: 2025-06-24

## 2025-06-24 ENCOUNTER — LAB REQUISITION (OUTPATIENT)
Dept: LAB | Facility: CLINIC | Age: 75
End: 2025-06-24

## 2025-06-24 DIAGNOSIS — Z12.5 ENCOUNTER FOR SCREENING FOR MALIGNANT NEOPLASM OF PROSTATE: ICD-10-CM

## 2025-06-24 DIAGNOSIS — I25.10 ATHEROSCLEROTIC HEART DISEASE OF NATIVE CORONARY ARTERY WITHOUT ANGINA PECTORIS: ICD-10-CM

## 2025-06-24 PROCEDURE — 82465 ASSAY BLD/SERUM CHOLESTEROL: CPT | Performed by: STUDENT IN AN ORGANIZED HEALTH CARE EDUCATION/TRAINING PROGRAM

## 2025-06-24 PROCEDURE — G0103 PSA SCREENING: HCPCS | Performed by: STUDENT IN AN ORGANIZED HEALTH CARE EDUCATION/TRAINING PROGRAM

## 2025-06-25 LAB
CHOLEST SERPL-MCNC: 146 MG/DL
FASTING STATUS PATIENT QL REPORTED: ABNORMAL
HDLC SERPL-MCNC: 39 MG/DL
LDLC SERPL CALC-MCNC: 78 MG/DL
NONHDLC SERPL-MCNC: 107 MG/DL
PSA SERPL DL<=0.01 NG/ML-MCNC: 1.4 NG/ML (ref 0–6.5)
TRIGL SERPL-MCNC: 145 MG/DL

## 2025-08-19 ENCOUNTER — MYC MEDICAL ADVICE (OUTPATIENT)
Dept: CARDIOLOGY | Facility: CLINIC | Age: 75
End: 2025-08-19

## 2025-08-19 ENCOUNTER — OFFICE VISIT (OUTPATIENT)
Dept: CARDIOLOGY | Facility: CLINIC | Age: 75
End: 2025-08-19
Payer: COMMERCIAL

## 2025-08-19 VITALS
BODY MASS INDEX: 38.54 KG/M2 | DIASTOLIC BLOOD PRESSURE: 78 MMHG | OXYGEN SATURATION: 98 % | SYSTOLIC BLOOD PRESSURE: 136 MMHG | RESPIRATION RATE: 14 BRPM | HEART RATE: 51 BPM | WEIGHT: 242.4 LBS

## 2025-08-19 DIAGNOSIS — I25.10 ATHEROSCLEROSIS OF NATIVE CORONARY ARTERY OF NATIVE HEART WITHOUT ANGINA PECTORIS: Primary | ICD-10-CM

## 2025-08-19 DIAGNOSIS — Z95.2 S/P TAVR (TRANSCATHETER AORTIC VALVE REPLACEMENT): ICD-10-CM

## 2025-08-19 DIAGNOSIS — I10 ESSENTIAL HYPERTENSION: ICD-10-CM

## 2025-08-19 PROCEDURE — 99214 OFFICE O/P EST MOD 30 MIN: CPT | Performed by: INTERNAL MEDICINE

## 2025-08-19 PROCEDURE — G2211 COMPLEX E/M VISIT ADD ON: HCPCS | Performed by: INTERNAL MEDICINE

## 2025-08-19 PROCEDURE — 3075F SYST BP GE 130 - 139MM HG: CPT | Performed by: INTERNAL MEDICINE

## 2025-08-19 PROCEDURE — 3078F DIAST BP <80 MM HG: CPT | Performed by: INTERNAL MEDICINE

## (undated) DEVICE — ANTIFOG SOLUTION W/FOAM PAD 31142527

## (undated) DEVICE — GLOVE PROTEXIS W/NEU-THERA 7.0  2D73TE70

## (undated) DEVICE — SU VICRYL 0 CT-2 27" UND J270H

## (undated) DEVICE — DRSG STERI STRIP 1/2X4" R1547

## (undated) DEVICE — BLADE KNIFE SURG 10 371110

## (undated) DEVICE — SU WND CLOSURE VLOC 90 ABS 3-0 18" P-14 VLOCM0124

## (undated) DEVICE — CAST PADDING 4" STERILE 9044S

## (undated) DEVICE — BNDG COBAN 4"X5YDS STERILE

## (undated) DEVICE — BLADE KNIFE BEAVER 376700

## (undated) DEVICE — CAST PADDING 4" WEBRIL STERILE

## (undated) DEVICE — NDL 25GA 1.5" 305127

## (undated) DEVICE — CAST PADDING 4" WEBRIL UNSTERILE

## (undated) DEVICE — GLOVE PROTEXIS W/NEU-THERA 7.5  2D73TE75

## (undated) DEVICE — DRSG KERLIX FLUFFS X5

## (undated) DEVICE — BNDG ELASTIC 4" DBL LENGTH UNSTERILE 6611-14

## (undated) DEVICE — SU MONOCRYL 3-0 PS-2 18" UND Y497G

## (undated) DEVICE — Device

## (undated) DEVICE — PACK HAND

## (undated) DEVICE — PREP CHLORHEXIDINE 4% 4OZ (HIBICLENS) 57504

## (undated) DEVICE — SU VICRYL 2-0 SH 27" UND J417H

## (undated) DEVICE — GOWN LG DISP 9515

## (undated) DEVICE — GLOVE PROTEXIS BLUE W/NEU-THERA 7.0  2D73EB70

## (undated) DEVICE — SOL ADH LIQUID BENZOIN SWAB 0.6ML C1544

## (undated) DEVICE — PREP SKIN SCRUB TRAY 4461A

## (undated) DEVICE — TOURNIQUET CUFF 18" STERILE

## (undated) RX ORDER — BUPIVACAINE HYDROCHLORIDE AND EPINEPHRINE 5; 5 MG/ML; UG/ML
INJECTION, SOLUTION EPIDURAL; INTRACAUDAL; PERINEURAL
Status: DISPENSED
Start: 2018-10-05

## (undated) RX ORDER — EPHEDRINE SULFATE 50 MG/ML
INJECTION, SOLUTION INTRAMUSCULAR; INTRAVENOUS; SUBCUTANEOUS
Status: DISPENSED
Start: 2018-10-05

## (undated) RX ORDER — BUPIVACAINE HYDROCHLORIDE 5 MG/ML
INJECTION, SOLUTION PERINEURAL
Status: DISPENSED
Start: 2018-10-05

## (undated) RX ORDER — PROPOFOL 10 MG/ML
INJECTION, EMULSION INTRAVENOUS
Status: DISPENSED
Start: 2018-10-05

## (undated) RX ORDER — ONDANSETRON 2 MG/ML
INJECTION INTRAMUSCULAR; INTRAVENOUS
Status: DISPENSED
Start: 2018-10-05

## (undated) RX ORDER — LIDOCAINE HYDROCHLORIDE 10 MG/ML
INJECTION, SOLUTION EPIDURAL; INFILTRATION; INTRACAUDAL; PERINEURAL
Status: DISPENSED
Start: 2018-10-05

## (undated) RX ORDER — SCOLOPAMINE TRANSDERMAL SYSTEM 1 MG/1
PATCH, EXTENDED RELEASE TRANSDERMAL
Status: DISPENSED
Start: 2018-10-05

## (undated) RX ORDER — PHENYLEPHRINE HCL IN 0.9% NACL 1 MG/10 ML
SYRINGE (ML) INTRAVENOUS
Status: DISPENSED
Start: 2018-10-05

## (undated) RX ORDER — CEFAZOLIN SODIUM 2 G/100ML
INJECTION, SOLUTION INTRAVENOUS
Status: DISPENSED
Start: 2018-10-05

## (undated) RX ORDER — HYDROCODONE BITARTRATE AND ACETAMINOPHEN 5; 325 MG/1; MG/1
TABLET ORAL
Status: DISPENSED
Start: 2018-10-05

## (undated) RX ORDER — CLINDAMYCIN PHOSPHATE 900 MG/50ML
INJECTION, SOLUTION INTRAVENOUS
Status: DISPENSED
Start: 2018-10-05

## (undated) RX ORDER — GABAPENTIN 300 MG/1
CAPSULE ORAL
Status: DISPENSED
Start: 2018-10-05

## (undated) RX ORDER — DEXAMETHASONE SODIUM PHOSPHATE 4 MG/ML
INJECTION, SOLUTION INTRA-ARTICULAR; INTRALESIONAL; INTRAMUSCULAR; INTRAVENOUS; SOFT TISSUE
Status: DISPENSED
Start: 2018-10-05

## (undated) RX ORDER — CELECOXIB 200 MG/1
CAPSULE ORAL
Status: DISPENSED
Start: 2018-10-05

## (undated) RX ORDER — GLYCOPYRROLATE 0.2 MG/ML
INJECTION, SOLUTION INTRAMUSCULAR; INTRAVENOUS
Status: DISPENSED
Start: 2018-10-05

## (undated) RX ORDER — FENTANYL CITRATE 50 UG/ML
INJECTION, SOLUTION INTRAMUSCULAR; INTRAVENOUS
Status: DISPENSED
Start: 2018-10-05

## (undated) RX ORDER — ACETAMINOPHEN 325 MG/1
TABLET ORAL
Status: DISPENSED
Start: 2018-10-05

## (undated) RX ORDER — LIDOCAINE HYDROCHLORIDE 10 MG/ML
INJECTION, SOLUTION INFILTRATION; PERINEURAL
Status: DISPENSED
Start: 2018-10-05